# Patient Record
Sex: MALE | Race: WHITE | Employment: UNEMPLOYED | URBAN - METROPOLITAN AREA
[De-identification: names, ages, dates, MRNs, and addresses within clinical notes are randomized per-mention and may not be internally consistent; named-entity substitution may affect disease eponyms.]

---

## 2020-08-06 ENCOUNTER — OFFICE VISIT (OUTPATIENT)
Dept: ORTHOPEDIC SURGERY | Age: 65
End: 2020-08-06
Payer: MEDICARE

## 2020-08-06 VITALS — RESPIRATION RATE: 16 BRPM | HEIGHT: 70 IN | BODY MASS INDEX: 27.92 KG/M2 | WEIGHT: 195 LBS

## 2020-08-06 PROCEDURE — 99203 OFFICE O/P NEW LOW 30 MIN: CPT | Performed by: ORTHOPAEDIC SURGERY

## 2020-08-06 PROCEDURE — 20605 DRAIN/INJ JOINT/BURSA W/O US: CPT | Performed by: ORTHOPAEDIC SURGERY

## 2020-08-06 RX ORDER — INGENOL MEBUTATE 500 UG/G
GEL TOPICAL
COMMUNITY

## 2020-08-06 RX ORDER — DONEPEZIL HYDROCHLORIDE 10 MG/1
TABLET, FILM COATED ORAL
COMMUNITY
End: 2020-08-06

## 2020-08-06 RX ORDER — CYCLOSPORINE 0.5 MG/ML
EMULSION OPHTHALMIC
COMMUNITY
Start: 2020-07-19

## 2020-08-06 RX ORDER — METOPROLOL SUCCINATE 50 MG/1
TABLET, EXTENDED RELEASE ORAL
COMMUNITY
Start: 2020-01-23 | End: 2020-08-06

## 2020-08-06 RX ORDER — TEMAZEPAM 15 MG/1
CAPSULE ORAL
COMMUNITY
Start: 2020-07-21

## 2020-08-06 RX ORDER — FEXOFENADINE HCL 180 MG/1
180 TABLET ORAL DAILY
COMMUNITY
End: 2020-08-06

## 2020-08-06 RX ORDER — HYDROCODONE BITARTRATE AND ACETAMINOPHEN 5; 325 MG/1; MG/1
TABLET ORAL
COMMUNITY
End: 2020-08-06

## 2020-08-06 RX ORDER — MINOCYCLINE HYDROCHLORIDE 50 MG/1
50 CAPSULE ORAL DAILY
COMMUNITY

## 2020-08-06 RX ORDER — DOXEPIN HYDROCHLORIDE 10 MG/1
10 CAPSULE ORAL NIGHTLY
COMMUNITY

## 2020-08-06 RX ORDER — PANTOPRAZOLE SODIUM 40 MG/1
TABLET, DELAYED RELEASE ORAL
COMMUNITY
Start: 2020-07-30
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
## 2020-08-06 NOTE — PATIENT INSTRUCTIONS
AC joint injection into left shoulder  Rest the shoulder today  Work on ROM of shoulder  Physical therapy ordered   Follow up in 1 month for recheck on inj/PT

## 2020-08-18 ENCOUNTER — HOSPITAL ENCOUNTER (OUTPATIENT)
Dept: PHYSICAL THERAPY | Age: 65
Setting detail: THERAPIES SERIES
Discharge: HOME | End: 2020-08-18
Payer: MEDICARE

## 2020-08-18 PROCEDURE — 97162 PT EVAL MOD COMPLEX 30 MIN: CPT

## 2020-08-18 ASSESSMENT — PAIN SCALES - GENERAL: PAINLEVEL_OUTOF10: 1

## 2020-08-18 ASSESSMENT — PAIN DESCRIPTION - LOCATION: LOCATION: ARM;SHOULDER

## 2020-08-18 ASSESSMENT — PAIN DESCRIPTION - ORIENTATION: ORIENTATION: LEFT

## 2020-08-18 ASSESSMENT — PAIN DESCRIPTION - DESCRIPTORS: DESCRIPTORS: SORE;SHARP
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
## 2020-08-18 NOTE — PLAN OF CARE
Outpatient Physical Therapy        [] Phone: [phone]   Fax: [phone]   Pediatric Therapy          [] Phone: [phone]   Fax: [phone]  [facility]          [] Phone: [phone]   Fax: [phone]      To: Referring Practitioner: Taj Olguin    From: Luther Haynes PT     Patient: Juan Antonio Chacon       : 1955  Diagnosis: Arthritis left AC joint   Treatment Diagnosis: LUE pain, weakness, impaired ROM, impaired function   Date: 2020    Physical Therapy Certification/Re-Certification Form  Dear Dr. Ace Hughes,  The following patient has been evaluated for physical therapy services and for therapy to continue, Please review the attached evaluation and/or summary of the patient's plan of care, and verify that you agree therapy should continue by signing the attached document and sending it back to our office. Assessment:  Patient is a 72 y.o. male w/DX arthritis of left AC joint. Patient reports onset of left shoulder pain which started in 2020 after a biking trip and sleeping on a thin mattress. Injection by Lalo Cortes helped decreased pain considerably. Pt also reports left knee chronic pain which he would like addressed. Patient had diagnostics w/x-ray (+) OA left AC joint  Worse:  sleeping on left side, press-ups, pull-ups, riding 10 speed bike. Better:  Cortisone injection, changing positions (resting)  Today, patient presents w/mild left shoulder weakness and ROM limitation, s/s impingement syndrome, radicular symptoms (pins/needles), pain and will benefit from physical therapy.     Planned Services:  [x] Therapeutic Exercise    [] Aquatics:  [x] Therapeutic Activity    [x] Ultrasound  [x] Elec Stimulation  [] Gait Training     [] Cervical Traction [] Lumbar Traction  [x] Neuromuscular Re-education [x] Cold/hotpack [] Iontophoresis   [x] Instruction in HEP       [x] Manual Therapy     [x] vasopneumatic            [x] Self care home management        []Dry

## 2020-08-18 NOTE — FLOWSHEET NOTE
Outpatient Physical Therapy  [place]           [x] Phone: [phone]   Fax: [phone]  [facility]           [] Phone: [phone]   Fax: [phone]        Physical Therapy Daily Treatment Note  Date:  2020    Patient Name:  Bessie Tuttle    :  1955  MRN: [id number]  Restrictions/Precautions: Other position/activity restrictions: No formal restrictions  Diagnosis:   Diagnosis: Arthritis left AC joint  Date of Injury/Surgery: chronic  Treatment Diagnosis: Treatment Diagnosis: LUE pain, weakness, impaired ROM, impaired function    Insurance/Certification information: PT Insurance Information: [employer] - $40 co-pay   Referring Physician:  Referring Practitioner: Vijaya Forrester  Next Doctor Visit:  Unknown  Plan of care signed (Y/N): Pending   Outcome Measure: Quick Dash 34% disability  Visit# / total visits:     Pain level: 1/10  POC Date Range:  20 - 20     Goals:          Long term goals  Time Frame for Long term goals : In 6 weeks, patient will  Long term goal 1: demonstrate compliance and independence w/HEP. Long term goal 2: score <= 10% disability on Quick Dash as indication of improved daily mobility and function LUE. Long term goal 3: report at least 50% improvement w/washing and drying back after shower. Long term goal 4: no longer report radicular symptoms LUE w/daily activities or at night w/sleep. Summary of Evaluation: Assessment: Patient is a 72 y.o. male w/DX arthritis of left AC joint. Patient reports onset of left shoulder pain which started in 2020 after a biking trip and sleeping on a thin mattress. Injection by Julio Cesar Cervantes helped decreased pain considerably. Pt also reports left knee chronic pain which he would like addressed. Patient had diagnostics w/x-ray (+) OA left AC joint  Worse:  sleeping on left side, press-ups, pull-ups, riding 10 speed bike.   Better:  Cortisone injection, changing positions (resting)  Today, patient presents w/mild left shoulder weakness and ROM limitation, s/s impingement syndrome, radicular symptoms (pins/needles), pain and will benefit from physical therapy. Subjective:  See eval         Any changes in Ambulatory Summary Sheet? None        Objective:  See eval     Prior to today's treatment session, patient was screened for signs and symptoms related to COVID-19 including but not limited to verbally answering questions related to feeling ill, cough, or SOB, along with taking temperature via forehead thermometer. Patient presented with all negative signs and symptoms and had no fever >100 degrees Fahrenheit this date. Exercises: (No more than 4 columns)   Exercise/Equipment Date 8/18/20 #1 Date Date           WARM UP Evaluation completed. Await Pre-cert. TABLE                                       STANDING                                                     PROPRIOCEPTION                                    MODALITIES                      Other Therapeutic Activities/Education:    POC and treatment progression reviewed with and approved by patient. Home Exercise Program:    Pending    Manual Treatments:    NA    Modalities:    NA    Communication with other providers:    POC faxed to ordering provider. Assessment:  (Response towards treatment session) (Pain Rating)  Pt reported increased discomfort left shoulder after evaluation. 2-3/10 deep ache. Assessment: Patient is a 72 y.o. male w/DX arthritis of left AC joint. Patient reports onset of left shoulder pain which started in April 2020 after a biking trip and sleeping on a thin mattress. Injection by Chace Alicia helped decreased pain considerably. Pt also reports left knee chronic pain which he would like addressed. Patient had diagnostics w/x-ray (+) OA left AC joint  Worse:  sleeping on left side, press-ups, pull-ups, riding 10 speed bike.   Better:  Cortisone injection, changing positions (resting)  Today, patient presents w/mild left shoulder weakness and ROM limitation, s/s impingement syndrome, radicular symptoms (pins/needles), pain and will benefit from physical therapy. Plan for Next Session: Specific instructions for Next Treatment: RC strengthening, GH joint mobilization for posterior and caudal glides, initiate HEP, US AC joint and anterior shoulder soft tissue 100%, 1.0 DC, 1.5W/cm2      Time In / Time Out:     1422/1512    If BWC Please Indicate Time In/Out  CPT Code Time in Time out                                                              Timed Code/Total Treatment Minutes:  0'/50'        Next Progress Note due:   On or before 9/17/20      Plan of Care Interventions:  [x] Therapeutic Exercise  [x] Modalities:  [x] Therapeutic Activity     [x] Ultrasound  [x] Estim  [] Gait Training      [] Cervical Traction [] Lumbar Traction  [x] Neuromuscular Re-education    [x] Cold/hotpack [] Iontophoresis   [x] Instruction in HEP      [x] Vasopneumatic   [] Dry Needling    [x] Manual Therapy               [] Aquatic Therapy              Electronically signed by:  Barrie Cunningham, PT 8/18/2020, 6:19 PM

## 2020-08-19 NOTE — FLOWSHEET NOTE
Patient only approved for 6 visits,  If more visits are needed therapist will have to do a review with [employer]. E stim is not covered.

## 2020-08-21 NOTE — FLOWSHEET NOTE
Patients Plan of Care was received and signed. Signed POC was scanned and placed in the patients chart.     Mayte Mejia

## 2020-08-31 ENCOUNTER — HOSPITAL ENCOUNTER (OUTPATIENT)
Dept: PHYSICAL THERAPY | Age: 65
Setting detail: THERAPIES SERIES
Discharge: HOME | End: 2020-08-31
Payer: MEDICARE

## 2020-08-31 PROCEDURE — 97140 MANUAL THERAPY 1/> REGIONS: CPT

## 2020-08-31 PROCEDURE — 97035 APP MDLTY 1+ULTRASOUND EA 15: CPT

## 2020-08-31 PROCEDURE — 97530 THERAPEUTIC ACTIVITIES: CPT

## 2020-08-31 PROCEDURE — 97110 THERAPEUTIC EXERCISES: CPT

## 2020-08-31 NOTE — FLOWSHEET NOTE
rate it. Pt will continue to benefit from more education, ROM , and strengthening. Assessment: Patient is a 72 y.o. male w/DX arthritis of left AC joint. Patient reports onset of left shoulder pain which started in April 2020 after a biking trip and sleeping on a thin mattress. Injection by Jed Messina helped decreased pain considerably. Pt also reports left knee chronic pain which he would like addressed. Patient had diagnostics w/x-ray (+) OA left AC joint  Worse:  sleeping on left side, press-ups, pull-ups, riding 10 speed bike. Better:  Cortisone injection, changing positions (resting)  Today, patient presents w/mild left shoulder weakness and ROM limitation, s/s impingement syndrome, radicular symptoms (pins/needles), pain and will benefit from physical therapy. Plan for Next Session: Specific instructions for Next Treatment: RC strengthening, GH joint mobilization for posterior and caudal glides, initiate HEP, US AC joint and anterior shoulder soft tissue 100%, 1.0 DC, 1.5W/cm2      Time In / Time Out: 1430/1524       If BWC Please Indicate Time In/Out  CPT Code Time in Time out                                                              Timed Code/Total Treatment Minutes:  54'/54'  1 US ( 8') 1 TA (8') 1 man( 16') 1 TE (20')       Next Progress Note due:   On or before 9/17/20      Plan of Care Interventions:  [x] Therapeutic Exercise  [x] Modalities:  [x] Therapeutic Activity     [x] Ultrasound  [x] Estim  [] Gait Training      [] Cervical Traction [] Lumbar Traction  [x] Neuromuscular Re-education    [x] Cold/hotpack [] Iontophoresis   [x] Instruction in HEP      [x] Vasopneumatic   [] Dry Needling    [x] Manual Therapy               [] Aquatic Therapy              Electronically signed by:  Justin López PTA 8/31/2020, 12:56 PM       8/31/2020,5:04 PM

## 2020-09-08 ENCOUNTER — HOSPITAL ENCOUNTER (OUTPATIENT)
Dept: PHYSICAL THERAPY | Age: 65
Setting detail: THERAPIES SERIES
Discharge: HOME | End: 2020-09-08
Payer: MEDICARE

## 2020-09-08 PROCEDURE — 97140 MANUAL THERAPY 1/> REGIONS: CPT

## 2020-09-08 PROCEDURE — 97110 THERAPEUTIC EXERCISES: CPT

## 2020-09-08 NOTE — FLOWSHEET NOTE
Outpatient Physical Therapy  [place]           [x] Phone: [phone]   Fax: [phone]  [place]           [] Phone: [phone]   Fax: [phone]        Physical Therapy Daily Treatment Note  Date:  2020    Patient Name:  Zoraida Qureshi    :  1955  MRN: [id number]  Restrictions/Precautions: Other position/activity restrictions: No formal restrictions  Diagnosis:   Diagnosis: Arthritis left AC joint  Date of Injury/Surgery: chronic  Treatment Diagnosis: Treatment Diagnosis: LUE pain, weakness, impaired ROM, impaired function    Insurance/Certification information: PT Insurance Information: [place] - $40 co-pay   Referring Physician:  Referring Practitioner: Lon Ge  Next Doctor Visit:  Unknown  Plan of care signed (Y/N): YES  Outcome Measure: Quick Dash 34% disability  Visit# / total visits:  3/6  Pain level: 0 /10 at rest  POC Date Range:  20 - 20  (Patient only approved for 6 visits,  If more visits are needed therapist will have to do a review with [employer]. E stim is not covered.)     Goals:          Long term goals  Time Frame for Long term goals : In 6 weeks, patient will  Long term goal 1: demonstrate compliance and independence w/HEP. Long term goal 2: score <= 10% disability on Quick Dash as indication of improved daily mobility and function LUE. Long term goal 3: report at least 50% improvement w/washing and drying back after shower. Long term goal 4: no longer report radicular symptoms LUE w/daily activities or at night w/sleep. Summary of Evaluation: Assessment: Patient is a 72 y.o. male w/DX arthritis of left AC joint. Patient reports onset of left shoulder pain which started in 2020 after a biking trip and sleeping on a thin mattress. Injection by Amber Hernandez helped decreased pain considerably. Pt also reports left knee chronic pain which he would like addressed.   Patient had diagnostics w/x-ray (+) OA left AC joint  Worse:  sleeping on left side, press-ups, pull-ups, riding 10 speed bike. Better:  Cortisone injection, changing positions (resting)  Today, patient presents w/mild left shoulder weakness and ROM limitation, s/s impingement syndrome, radicular symptoms (pins/needles), pain and will benefit from physical therapy. Subjective:  Pt rode bike 3 miles to this appointment today w/o difficulty. States his left neck will be painful after riding 13 miles and feels it is associated w/his shoulder problem. Any changes in Ambulatory Summary Sheet? None        Objective:     Prior to today's treatment session, patient was screened for signs and symptoms related to COVID-19 including but not limited to verbally answering questions related to feeling ill, cough, or SOB, along with taking temperature via forehead thermometer. Patient presented with all negative signs and symptoms and had no fever >100 degrees Fahrenheit this date. Decreased scap mobility left (assymetric w/right)  R wings naturally/L fixed to ribcage  Rounded shoulder   Forward head  Hypomobility lower C-spine/T-spine  Pronounced C-7  TTP parascapular muscles (latent) at T3-6      Exercises: (No more than 4 columns)   Exercise/Equipment Date 8/18/20 #1 Date  8/31/2020 #2 Date 9/08/20  #3           WARM UP Evaluation completed. Await Pre-cert. TABLE      Prone HA      Prone Extension      Prone Rows   LUE  2 x 10  2# hand wt  Vc/tc required   Chin tucks   Towel roll under crown of head  2 x 10            STANDING      ER       LAE      Mid row    GTB  2 x 10  Vc/tc required                                PROPRIOCEPTION                                    MODALITIES                      Other Therapeutic Activities/Education:    POC and treatment progression reviewed with and approved by patient. Home Exercise Program:    Prone shoulder extension, HA, prone rows, standing ER w/ TB, mid row w/ TB,and LAE w/ TB .  Given blue and yellow TB  9/8/20: Added chin tucks in supine    Manual Treatments:    Prone:  A/P joint mobs lower c-spine/upper t-spine  R side lying:  Scapular mobilization for subscapularis elongation and upward rotation   Supine:  Subscapularis soft tissue release  C-spine lateral and A/P glides  bilat levator stretch  bilat pec minor and UT stretch  C-spine PROM RR/RL      Modalities:    NA    Communication with other providers:    POC faxed to ordering provider. Assessment:  (Response towards treatment session) (Pain Rating) Pt tolerated treatment fair. Pt reported increased pain after treatment,but did not rate it. Pt will continue to benefit from more education, ROM , and strengthening. Assessment: Patient is a 72 y.o. male w/DX arthritis of left AC joint. Patient reports onset of left shoulder pain which started in April 2020 after a biking trip and sleeping on a thin mattress. Injection by Jennifer Loco helped decreased pain considerably. Pt also reports left knee chronic pain which he would like addressed. Patient had diagnostics w/x-ray (+) OA left AC joint  Worse:  sleeping on left side, press-ups, pull-ups, riding 10 speed bike. Better:  Cortisone injection, changing positions (resting)  Today, patient presents w/mild left shoulder weakness and ROM limitation, s/s impingement syndrome, radicular symptoms (pins/needles), pain and will benefit from physical therapy. Plan for Next Session: Specific instructions for Next Treatment: RC strengthening, GH joint mobilization for posterior and caudal glides, initiate HEP, US AC joint and anterior shoulder soft tissue 100%, 1.0 DC, 1.5W/cm2      Time In / Time Out: 8781/0728    If [facility] Please Indicate Time In/Out  CPT Code Time in Time out                                                              Timed Code/Total Treatment Minutes:  TE 35' (2), MT 30' (2)      Next Progress Note due:   On or before 9/17/20      Plan of Care Interventions:  [x] Therapeutic Exercise  [x]

## 2020-09-15 ENCOUNTER — HOSPITAL ENCOUNTER (OUTPATIENT)
Dept: PHYSICAL THERAPY | Age: 65
Setting detail: THERAPIES SERIES
Discharge: HOME | End: 2020-09-15
Payer: MEDICARE

## 2020-09-15 PROCEDURE — 97110 THERAPEUTIC EXERCISES: CPT

## 2020-09-15 PROCEDURE — 97112 NEUROMUSCULAR REEDUCATION: CPT

## 2020-09-15 NOTE — FLOWSHEET NOTE
side, press-ups, pull-ups, riding 10 speed bike. Better:  Cortisone injection, changing positions (resting)  Today, patient presents w/mild left shoulder weakness and ROM limitation, s/s impingement syndrome, radicular symptoms (pins/needles), pain and will benefit from physical therapy. Subjective:  Pt rode bike 3 miles to this appointment today w/o difficulty. States his left neck will be painful after riding 13 miles and feels it is associated w/his shoulder problem. Any changes in Ambulatory Summary Sheet? None        Objective:     Prior to today's treatment session, patient was screened for signs and symptoms related to COVID-19 including but not limited to verbally answering questions related to feeling ill, cough, or SOB, along with taking temperature via forehead thermometer. Patient presented with all negative signs and symptoms and had no fever >100 degrees Fahrenheit this date. Decreased scap mobility left (assymetric w/right)  R wings naturally/L fixed to ribcage  Rounded shoulder   Forward head  Hypomobility lower C-spine/T-spine  Pronounced C-7  TTP parascapular muscles (latent) at T3-6      Exercises: (No more than 4 columns)   Exercise/Equipment Date 8/18/20 #1 Date  8/31/2020 #2 Date 9/08/20  #3           WARM UP Evaluation completed. Await Pre-cert. TABLE      Prone HA      Prone Extension      Prone Rows   LUE  2 x 10  2# hand wt  Vc/tc required   Chin tucks   Towel roll under crown of head  2 x 10            STANDING      ER       LAE      Mid row    GTB  2 x 10  Vc/tc required                                PROPRIOCEPTION                                    MODALITIES                      Other Therapeutic Activities/Education:    POC and treatment progression reviewed with and approved by patient. Home Exercise Program:    Prone shoulder extension, HA, prone rows, standing ER w/ TB, mid row w/ TB,and LAE w/ TB .  Given blue and yellow TB  9/8/20: Added chin tucks in supine    Manual Treatments:    Prone:  A/P joint mobs lower c-spine/upper t-spine  R side lying:  Scapular mobilization for subscapularis elongation and upward rotation   Supine:  Subscapularis soft tissue release  C-spine lateral and A/P glides  bilat levator stretch  bilat pec minor and UT stretch  C-spine PROM RR/RL      Modalities:    NA    Communication with other providers:    POC faxed to ordering provider. Assessment:  (Response towards treatment session) (Pain Rating) Pt tolerated treatment fair. Pt reported increased pain after treatment,but did not rate it. Pt will continue to benefit from more education, ROM , and strengthening. Assessment: Patient is a 72 y.o. male w/DX arthritis of left AC joint. Patient reports onset of left shoulder pain which started in April 2020 after a biking trip and sleeping on a thin mattress. Injection by Nabila Blinks helped decreased pain considerably. Pt also reports left knee chronic pain which he would like addressed. Patient had diagnostics w/x-ray (+) OA left AC joint  Worse:  sleeping on left side, press-ups, pull-ups, riding 10 speed bike. Better:  Cortisone injection, changing positions (resting)  Today, patient presents w/mild left shoulder weakness and ROM limitation, s/s impingement syndrome, radicular symptoms (pins/needles), pain and will benefit from physical therapy. Plan for Next Session: Specific instructions for Next Treatment: RC strengthening, GH joint mobilization for posterior and caudal glides, initiate HEP, US AC joint and anterior shoulder soft tissue 100%, 1.0 DC, 1.5W/cm2      Time In / Time Out: 9783/9922    If [facility] Please Indicate Time In/Out  CPT Code Time in Time out                                                              Timed Code/Total Treatment Minutes:  TE 35' (2), MT 30' (2)      Next Progress Note due:   On or before 9/17/20      Plan of Care Interventions:  [x] Therapeutic Exercise  [x] Modalities:  [x] Therapeutic Activity     [x] Ultrasound  [x] Estim  [] Gait Training      [] Cervical Traction [] Lumbar Traction  [x] Neuromuscular Re-education    [x] Cold/hotpack [] Iontophoresis   [x] Instruction in HEP      [x] Vasopneumatic   [] Dry Needling    [x] Manual Therapy               [] Aquatic Therapy              Electronically signed by:  Darcie Cota, PT 9/15/2020, 2:43 PM       9/15/2020,2:43 PM

## 2020-09-15 NOTE — FLOWSHEET NOTE
Outpatient Physical Therapy  [place]           [x] Phone: [phone]   Fax: [phone]  [place]           [] Phone: [phone]   Fax: [phone]        Physical Therapy Daily Treatment Note  Date:  9/15/2020    Patient Name:  Dayanara Blair    :  1955  MRN: [id number]  Restrictions/Precautions: Other position/activity restrictions: No formal restrictions  Diagnosis:   Diagnosis: Arthritis left AC joint  Date of Injury/Surgery: chronic  Treatment Diagnosis: Treatment Diagnosis: LUE pain, weakness, impaired ROM, impaired function    Insurance/Certification information: PT Insurance Information: [place] - $40 co-pay   Referring Physician:  Referring Practitioner: Mo Mcginnis Doctor Visit:  Unknown  Plan of care signed (Y/N): YES  Outcome Measure: Quick Dash 34% disability  Visit# / total visits:  /  (Eval plus 6)  Pain level: 0 /10 at rest  POC Date Range:  20 - 20  (Patient only approved for 6 visits,  If more visits are needed therapist will have to do a review with [employer]. E stim is not covered.)     Goals:          Long term goals  Time Frame for Long term goals : In 6 weeks, patient will  Long term goal 1: demonstrate compliance and independence w/HEP. Long term goal 2: score <= 10% disability on Quick Dash as indication of improved daily mobility and function LUE. Long term goal 3: report at least 50% improvement w/washing and drying back after shower. Long term goal 4: no longer report radicular symptoms LUE w/daily activities or at night w/sleep. Summary of Evaluation: Assessment: Patient is a 72 y.o. male w/DX arthritis of left AC joint. Patient reports onset of left shoulder pain which started in 2020 after a biking trip and sleeping on a thin mattress. Injection by Brian Gonzalez helped decreased pain considerably. Pt also reports left knee chronic pain which he would like addressed.   Patient had diagnostics w/x-ray (+) OA left AC joint  Worse: sleeping on left side, press-ups, pull-ups, riding 10 speed bike. Better:  Cortisone injection, changing positions (resting)  Today, patient presents w/mild left shoulder weakness and ROM limitation, s/s impingement syndrome, radicular symptoms (pins/needles), pain and will benefit from physical therapy. Subjective:  Pt rode bike 17 miles yesterday w/moderate neck pain. Today, riding 13 miles here for appointment, neck pain is mild. Continues to have left shoulder pain when resting it on the armchair or elevated on a table, and will have to let it dangle. Overall, patient has difficulty knowing if he is improving. Able to pin-point shoulder pain at posterolateral shoulder. Any changes in Ambulatory Summary Sheet? None    Objective:     Prior to today's treatment session, patient was screened for signs and symptoms related to COVID-19 including but not limited to verbally answering questions related to feeling ill, cough, or SOB, along with taking temperature via forehead thermometer. Patient presented with all negative signs and symptoms and had no fever >100 degrees Fahrenheit this date. Decreased scap mobility left (assymetric w/right)  R wings naturally/L fixed to ribcage  Rounded shoulders  Forward head  Hypomobility lower C-spine/T-spine  Pronounced C-7  TTP parascapular muscles (latent) at T3-6  TTP (mild) teres minor and soft tissue surrounding AC joint      Exercises: (No more than 4 columns)   Exercise/Equipment Date 8/18/20   Evaluation Date  8/31/2020 #1 Date 9/08/20  #2 Date 9/15/20 #3           Address goals/ROM/strength/  Quick Dash this visit   WARM UP Evaluation completed. Await Pre-cert.  UBE   3'F  3'B  (late entry 9/15/20 SM) UBE   3'F  3'B  (late entry 9/15/20 SM) UBE   3'F  3'B                       TABLE        Prone FLOR                        Prone flexion    2# hand wt  2 x 10    Prone Abd/straight elbow    2# hand wt  2 x 10    Prone Extension    2# hand wt  2 x 10 Prone Rows   LUE  2 x 10  2# hand wt  Vc/tc required LUE  2 x 10  3# hand wt      Prone ER 90/90    1#  2 x 10    Chin tucks   Towel roll under crown of head  2 x 10 Reviewed               STANDING        ER     YTB   Towel under arm  2 x 10    LAE        Mid row    GTB  2 x 10  Vc/tc required GTB  2 x 10  Vc/tc required    TRX Dangle    1 x 10 sec                                 PROPRIOCEPTION                                                MODALITIES                            Other Therapeutic Activities/Education:    POC and treatment progression reviewed with and approved by patient. Neuromuscular Re-education:  VC/TC/Demo for proper technique and TC for muscle recruitment given to ensure maximum therapeutic benefit and functional outcome. Activities performed included: All prone and standing exercises listed above. Home Exercise Program:    Prone shoulder extension, HA, prone rows, standing ER w/ TB, mid row w/ TB,and LAE w/ TB . Given blue and yellow TB  9/8/20:  Added chin tucks in supine  9/15/20:  Added and/or reviewed  Prone shoulder w/2# wt flex, horizontal abd, horizontal abd 90/90 (bucket lift), ER 90/90 1# wt  TRX dangle       Manual Treatments:      Modalities:    NA    Communication with other providers:    POC faxed to ordering provider. Assessment:  (Response towards treatment session) (Pain Rating)   0/10 pain relaxed standing position. No grimacing or complaint of pain during session. Today, patient wished to review his HEP, however had difficulty understanding rationale and proper execution of exercises. Pt appears to be a very logically minded individual, however had scattered thought pattern today, moving from one activity to another and  was difficult to follow. Patient states he prefers written instruction, a bullet point list of exercises vs handouts. Assessment: Patient is a 72 y.o. male w/DX arthritis of left AC joint. Patient reports onset of left shoulder pain which started in April 2020 after a biking trip and sleeping on a thin mattress. Injection by Sade Garcia helped decreased pain considerably. Pt also reports left knee chronic pain which he would like addressed. Patient had diagnostics w/x-ray (+) OA left AC joint  Worse:  sleeping on left side, press-ups, pull-ups, riding 10 speed bike. Better:  Cortisone injection, changing positions (resting)  Today, patient presents w/mild left shoulder weakness and ROM limitation, s/s impingement syndrome, radicular symptoms (pins/needles), pain and will benefit from physical therapy. Plan for Next Session: Specific instructions for Next Treatment: RC strengthening, GH joint mobilization for posterior and caudal glides, initiate HEP, US AC joint and anterior shoulder soft tissue 100%, 1.0 DC, 1.5W/cm2    Develop bullet point list of exercise w/client. Time In / Time Out:  1445/1545    If [facility] Please Indicate Time In/Out  CPT Code Time in Time out                                                              Timed Code/Total Treatment Minutes:  60'/60'  TE 39' (3), NRE 15' (1)      Next Progress Note due:   On or before 9/17/20      Plan of Care Interventions:  [x] Therapeutic Exercise  [x] Modalities:  [x] Therapeutic Activity     [x] Ultrasound  [x] Estim  [] Gait Training      [] Cervical Traction [] Lumbar Traction  [x] Neuromuscular Re-education    [x] Cold/hotpack [] Iontophoresis   [x] Instruction in HEP      [x] Vasopneumatic   [] Dry Needling    [x] Manual Therapy               [] Aquatic Therapy              Electronically signed by:  Joann Acosta, INEZ 9/15/2020, 2:49 PM       9/15/2020,2:49 PM

## 2020-09-22 ENCOUNTER — HOSPITAL ENCOUNTER (OUTPATIENT)
Dept: PHYSICAL THERAPY | Age: 65
Setting detail: THERAPIES SERIES
Discharge: HOME | End: 2020-09-22
Payer: MEDICARE

## 2020-09-22 PROCEDURE — 97162 PT EVAL MOD COMPLEX 30 MIN: CPT

## 2020-09-22 ASSESSMENT — PAIN DESCRIPTION - LOCATION: LOCATION: KNEE

## 2020-09-22 ASSESSMENT — PAIN DESCRIPTION - PAIN TYPE: TYPE: CHRONIC PAIN

## 2020-09-22 ASSESSMENT — PAIN DESCRIPTION - DESCRIPTORS: DESCRIPTORS: SHARP;SORE

## 2020-09-22 ASSESSMENT — PAIN SCALES - GENERAL: PAINLEVEL_OUTOF10: 0

## 2020-09-22 ASSESSMENT — PAIN DESCRIPTION - ORIENTATION: ORIENTATION: LEFT

## 2020-09-22 ASSESSMENT — PAIN DESCRIPTION - FREQUENCY: FREQUENCY: INTERMITTENT

## 2020-09-22 NOTE — FLOWSHEET NOTE
Outpatient Physical Therapy  [place]           [x] Phone: [phone]   Fax: [phone]  [place]           [] Phone: [phone]   Fax: [phone]        Physical Therapy Daily Treatment Note  Date:  2020    Patient Name:  Karan Jones    :  1955  MRN: [id number]  Restrictions/Precautions: Other position/activity restrictions: No formal restrictions  Diagnosis:   Diagnosis: Left leg pain  Date of Injury/Surgery: Chronic  Treatment Diagnosis: Treatment Diagnosis: L knee weakness, symptoms consistent w/patella tendon syndrome    Insurance/Certification information: PT Insurance Information: [place] - $40 co-pay   Referring Physician:  Referring Practitioner: Haven Brown  Next Doctor Visit:   Unknown  Plan of care signed (Y/N):  Pending  Outcome Measure: KOOS 15/28 (53.6%)  Visit# / total visits:     Or as per insurance approval  Pain level: 0/10 (can go up to 7-8/10 w/loading knee)  POC Date Range 20 - 10/22/20     Goals:          Long term goals  Time Frame for Long term goals : In 6 weeks, patient will  Long term goal 1: demonstrate compliance and independence w/HEP. Long term goal 2: score <= 5/28 on KOOS score as indication of improved LE function w/daily activities. Long term goal 3: have no >= 3/10 left knee discomfort w/weight bearing activities for improved LE function (stairs, on/off bike, sit><stand, squatting, kneeling)    Summary of Evaluation: Assessment: Patient is a 72 y.o. male w/L knee pain. Pt reports left knee pain x >= 15 years which has been worsening over the years. Especially over the last two months and w/certain activities. Pt points directly to left patella tendon and demonstrates increased pain w/standing knee flexion b/t 20 and 30 deg and w/deceleration. Increased pain also w/pivot over planted foot. PLOF:  Patient has been and is independent w/all mobility, self-care, high level physical activities.   WORSE:  stairs, getting on bike, deceleration LLE, standing prolonged periods,pain w/standing knee flexion b/t 20 and 30 deg and w/deceleration. Able to cycle up to 26 miles w/o left knee soreness, diffuse mild soreness w/running. Today, patient presents w/lateral tracking bilat patella, related to shortened/tight ITBs, shortened/tight hamstrings bilat, mild weakness L knee, pain w/eccentric loading left quad and will benefit from physical therapy. Subjective:  See eval         Any changes in Ambulatory Summary Sheet? None        Objective:  See eval     Prior to today's treatment session, patient was screened for signs and symptoms related to COVID-19 including but not limited to verbally answering questions related to feeling ill, cough, or SOB, along with taking temperature via forehead thermometer. Patient presented with all negative signs and symptoms and had no fever >100 degrees Fahrenheit this date. Exercises: (No more than 4 columns)   Exercise/Equipment Date 9/22/20 - Evaluation Date Date      Await insurance approval.     WARM UP                     TABLE                                       STANDING                                                     PROPRIOCEPTION                                    MODALITIES                      Other Therapeutic Activities/Education:    Reviewed POC and treatment progression expected. Home Exercise Program:    Self-massage ITB  SLR w/VMO focus    Manual Treatments:    NA    Modalities:    NA    Communication with other providers:        Assessment:  (Response towards treatment session) (Pain Rating)  No change in pain level post treatment. Had lingering discomfort after single leg sit trial.    Assessment: Patient is a 72 y.o. male w/L knee pain. Pt reports left knee pain x >= 15 years which has been worsening over the years. Especially over the last two months and w/certain activities.   Pt points directly to left patella tendon and demonstrates increased pain w/standing knee flexion b/t 20 and 30 deg and w/deceleration. Increased pain also w/pivot over planted foot. PLOF:  Patient has been and is independent w/all mobility, self-care, high level physical activities. WORSE:  stairs, getting on bike, deceleration LLE, standing prolonged periods,pain w/standing knee flexion b/t 20 and 30 deg and w/deceleration. Able to cycle up to 26 miles w/o left knee soreness, diffuse mild soreness w/running. Today, patient presents w/lateral tracking bilat patella, related to shortened/tight ITBs, shortened/tight hamstrings bilat, mild weakness L knee, pain w/eccentric loading left quad and will benefit from physical therapy. Plan for Next Session: Specific instructions for Next Treatment: ITB myofascial/STM and stretch, hamstring stretch,quad strengthening/focus VMO, rotary hip add/ext, closed chain hamstring      Time In / Time Out:     6156/0071    If [facility] Please Indicate Time In/Out  CPT Code Time in Time out                                                              Timed Code/Total Treatment Minutes:  0/57'  Eval only then precert      Next Progress Note due:   On or before 10/22/20      Plan of Care Interventions:  [x] Therapeutic Exercise  [x] Modalities:  [x] Therapeutic Activity     [x] Ultrasound  [x] Estim  [] Gait Training      [] Cervical Traction [] Lumbar Traction  [x] Neuromuscular Re-education    [x] Cold/hotpack [] Iontophoresis   [x] Instruction in HEP      [x] Vasopneumatic   [x] Dry Needling    [x] Manual Therapy               [] Aquatic Therapy              Electronically signed by:  Satish Roth, PT 9/22/2020, 7:00 PM

## 2020-09-22 NOTE — PLAN OF CARE
Outpatient Physical Therapy        [] Phone: [phone]   Fax: [phone]   Pediatric Therapy          [] Phone: [phone]   Fax: [phone]  [facility]          [] Phone: [phone]   Fax: [phone]      To: Referring Practitioner: Layla Thorpe    From: Marcelino Harris, PT     Patient: Souleymane Adam       : 1955  Diagnosis: Left leg pain   Treatment Diagnosis: L knee weakness, symptoms consistent w/patella tendon syndrome   Date: 2020    Physical Therapy Certification/Re-Certification Form  Dear Dr. Barry Roger,  The following patient has been evaluated for physical therapy services and for therapy to continue, Please review the attached evaluation and/or summary of the patient's plan of care, and verify that you agree therapy should continue by signing the attached document and sending it back to our office. Assessment:  Patient is a 72 y.o. male w/L knee pain. Pt reports left knee pain x >= 15 years which has been worsening over the years. Especially over the last two months and w/certain activities. Pt points directly to left patella tendon and demonstrates increased pain w/standing knee flexion b/t 20 and 30 deg and w/deceleration. Increased pain also w/pivot over planted foot. PLOF:  Patient has been and is independent w/all mobility, self-care, high level physical activities. WORSE:  stairs, getting on bike, deceleration LLE, standing prolonged periods,pain w/standing knee flexion b/t 20 and 30 deg and w/deceleration. Able to cycle up to 26 miles w/o left knee soreness, diffuse mild soreness w/running. Today, patient presents w/lateral tracking bilat patella, related to shortened/tight ITBs, shortened/tight hamstrings bilat, mild weakness L knee, pain w/eccentric loading left quad and will benefit from physical therapy.     Planned Services:  [x] Therapeutic Exercise    [] Aquatics:  [x] Therapeutic Activity    [x] Ultrasound  [x] Elec Stimulation  [] Gait

## 2020-09-22 NOTE — PROGRESS NOTES
Physical Therapy  Initial Assessment  Date: 2020  Patient Name: Angeles Zepeda  MRN: [id number]  : 1955     Treatment Diagnosis: L knee weakness, symptoms consistent w/patella tendon syndrome    Restrictions  Position Activity Restriction  Other position/activity restrictions: No formal restrictions    Subjective   General  Chart Reviewed: Yes  Patient assessed for rehabilitation services?: Yes  Additional Pertinent Hx: PMH:  Hx fractured collar bones, left surgically repaired 5 years ago, fractured L wrist w/surgical repair approx 20 years ago, sleep apnea, chest pain w/running, stress test negative, cardiac ablation(s) d/t a-fib  Family / Caregiver Present: No  Referring Practitioner: Nelson Brock  Referral Date : 20  Diagnosis: Left leg pain  Follows Commands: Within Functional Limits  General Comment  Comments: Pt reports left knee pain x >= 15 years which has been worsening over the years. Especially over the last two months and w/certain activities. Pt points directly to left patella tendon and demonstrates increased pain w/standing knee flexion b/t 20 and 30 deg and w/deceleration. Increased pain also w/pivot over planted foot. PLOF:  Patient has been and is independent w/all mobility, self-care, high level physical activities. WORSE:  stairs, getting on bike, standing prolonged periods,pain w/standing knee flexion b/t 20 and 30 deg and w/deceleration. Able to cycle up to 26 miles w/o left knee soreness, diffuse mild soreness w/running. PT Visit Information  PT Insurance Information: [place] - $40 co-pay  Pain Screening  Patient Currently in Pain: Yes  Pain Assessment  Pain Assessment: 0-10  Pain Level: 0  Patient's Stated Pain Goal: No pain  Pain Type: Chronic pain  Pain Location: Knee  Pain Orientation: Left  Pain Descriptors: Samara [place]; Sore  Pain Frequency: Intermittent  Vital Signs  Patient Currently in Pain: Yes    Vision/Hearing  Vision  Vision: Within Functional Limits  Hearing  Hearing: Within functional limits    Orientation  Orientation  Overall Orientation Status: Within Normal Limits    Social/Functional History  Social/Functional History  Lives With: Alone  Type of Home: House  Home Layout: One level  Home Access: Stairs to enter with rails  Entrance Stairs - Number of Steps: approx 6 rosemary w/left rail to enter  Entrance Stairs - Rails: Left  ADL Assistance: [address]: Independent  Homemaking Responsibilities: Yes  Ambulation Assistance: Independent  Transfer Assistance: Independent  Active : Yes  Mode of Transportation: Car  Occupation: Unemployed  Type of occupation: RN  Leisure & Hobbies: working out, physical activity    Objective     Observation/Palpation  Posture: Good  Palpation: Mild TTP left patella tendon, superior patella w/joint mobilization    PROM RLE (degrees)  RLE PROM: Exceptions  R SLR: 0-60 deg supine  R Hip Extension 0-10: 0-10  AROM RLE (degrees)  RLE AROM: Exceptions  R Knee Flexion 0-145: 0-135  R Knee Extension 0: 0  PROM LLE (degrees)  LLE PROM: Exceptions  L SLR: 0-66 deg supine  L Hip Flexion 0-125: 0-125  L Hip Extension 0-10: 0-10  AROM LLE (degrees)  LLE AROM : Exceptions  L Knee Flexion 0-145: 0-130    Strength RLE  Comment: 5/5 MMGs  Strength LLE  Comment: 5/5 hip flex/ext, 4/5 knee ext, 5/5 knee flex     Additional Measures  Flexibility: shortened/tight ITB R/L, shortened/tight hamstrings R/L  Special Tests: (+) Tevin's bilat for ITB shortening, unable to perform single leg sit/stand LLE (able to perform on right), crepitus bilat patella w/pain left, (-) valgus/varus pain, (-) anterior/posterior drawer (ligaments intact)  Other: R/L Patella track laterally,  KOOS 15/28 (53.6% disability)  Sensation  Overall Sensation Status: (intact gross touch bilat LEs.   Denies radicular pain or paresthesias today.)     Ambulation  Ambulation?: Yes  Ambulation 1  Surface: level tile;carpet  Device: No Device  Assistance: Independent  Quality of Gait: Non-antalgic, brisk gait  Gait Deviations: None  Distance: 100 ft x 2     Assessment   Conditions Requiring Skilled Therapeutic Intervention  Body structures, Functions, Activity limitations: Decreased strength; Increased pain;Decreased ROM; Decreased ADL status; Decreased functional mobility   Assessment: Patient is a 72 y.o. male w/L knee pain. Pt reports left knee pain x >= 15 years which has been worsening over the years. Especially over the last two months and w/certain activities. Pt points directly to left patella tendon and demonstrates increased pain w/standing knee flexion b/t 20 and 30 deg and w/deceleration. Increased pain also w/pivot over planted foot. PLOF:  Patient has been and is independent w/all mobility, self-care, high level physical activities. WORSE:  stairs, getting on bike, deceleration LLE, standing prolonged periods,pain w/standing knee flexion b/t 20 and 30 deg and w/deceleration. Able to cycle up to 26 miles w/o left knee soreness, diffuse mild soreness w/running. Today, patient presents w/lateral tracking bilat patella, related to shortened/tight ITBs, shortened/tight hamstrings bilat, mild weakness L knee, pain w/eccentric loading left quad and will benefit from physical therapy. Treatment Diagnosis: L knee weakness, symptoms consistent w/patella tendon syndrome  Prognosis: Good  Decision Making: Medium Complexity  History: PMH:  Hx fractured collar bones, left surgically repaired 5 years ago, fractured L wrist w/surgical repair approx 20 years ago, sleep apnea, chest pain w/running, stress test negative, cardiac ablation(s) d/t a-fib  Exam: MMT, ROM, palpation  Clinical Presentation: Medium complexity - evolving/chronic nature of injury  Barriers to Learning: None noted today.   Learns w/demo, repeat demo/repetition, handouts  REQUIRES PT FOLLOW UP: Yes  Treatment Initiated : see flow sheet  Activity Tolerance  Activity Tolerance: Patient Tolerated treatment well         Plan   Plan  Times per week: 1  Plan weeks: 6  Specific instructions for Next Treatment: ITB myofascial/STM and stretch, hamstring stretch,quad strengthening/focus VMO, rotary hip add/ext, closed chain hamstring  Current Treatment Recommendations: Strengthening, ROM, Neuromuscular Re-education, Manual Therapy - Soft Tissue Mobilization, Manual Therapy - Joint Manipulation, Pain Management, Modalities, Patient/Caregiver Education & Training, Home Exercise Program    Goals  Long term goals  Time Frame for Long term goals : In 6 weeks, patient will  Long term goal 1: demonstrate compliance and independence w/HEP. Long term goal 2: score <= 5/28 on KOOS score as indication of improved LE function w/daily activities.   Long term goal 3: have no >= 3/10 left knee discomfort w/weight bearing activities for improved LE function (stairs, on/off bike, sit><stand, squatting, kneeling)  Patient Goals   Patient goals : improve left knee function/decrease pain w/daily activity (biking, stairs, squats)       Therapy Time   Individual Concurrent Group Co-treatment   Time In 1448         Time Out 1545         Minutes 57         Timed Code Treatment Minutes: 0 Minutes       Reagan Nicholson, PT

## 2020-09-24 ENCOUNTER — HOSPITAL ENCOUNTER (OUTPATIENT)
Dept: PHYSICAL THERAPY | Age: 65
Setting detail: THERAPIES SERIES
Discharge: HOME | End: 2020-09-24
Payer: MEDICARE

## 2020-09-24 PROCEDURE — 97110 THERAPEUTIC EXERCISES: CPT

## 2020-09-24 NOTE — FLOWSHEET NOTE
WORSE:  stairs, getting on bike, deceleration LLE, standing prolonged periods,pain w/standing knee flexion b/t 20 and 30 deg and w/deceleration. Able to cycle up to 26 miles w/o left knee soreness, diffuse mild soreness w/running. Today, patient presents w/lateral tracking bilat patella, related to shortened/tight ITBs, shortened/tight hamstrings bilat, mild weakness L knee, pain w/eccentric loading left quad and will benefit from physical therapy. Subjective:  Quincy Walters reports that he has been performing his home exercises diligently. He did notice some increased pain with the SLR. Any changes in Ambulatory Summary Sheet? None        Objective:  See eval     Prior to today's treatment session, patient was screened for signs and symptoms related to COVID-19 including but not limited to verbally answering questions related to feeling ill, cough, or SOB, along with taking temperature via forehead thermometer. Patient presented with all negative signs and symptoms and had no fever >100 degrees Fahrenheit this date. Creptitus palpation with B knee flexion and extension          Exercises: (No more than 4 columns)   Exercise/Equipment Date 9/22/20 - Evaluation 9/24/20 #2 Date      Await insurance approval.     WARM UP                     TABLE      SLR with VMO  2x10 (tactile and verbal cueing for VMO contraction)    Supine hamstring stretch  3x30\" hold    Supine IT band stretch  3x30\"hold                   STANDING      Dynamic lateral leg stretch (hip swing)  30\", x2    Dynamic hams/hip flex stretch (hip swing forward and back)  30\", x2                                       PROPRIOCEPTION                                    MODALITIES                      Other Therapeutic Activities/Education:    Reviewed POC and treatment progression expected.     Home Exercise Program:    Self-massage ITB  SLR w/VMO focus  9/24/20:  Dynamic hip stretching (leg swings lateral, leg swings forward/backward)  Static hip stretching with stretch strap, IT band stretch and Hamstring stretch    Manual Treatments:    NA    Modalities:    NA    Communication with other providers:        Assessment:  (Response towards treatment session) (Pain Rating)  Stacey Gilbert with a good response towards today's treatment session. Dynamic IT band and hamstring stretches added in with hip swings. Additionally, static IT and hamstring stretches reviewed using stretch strap. Continue with work on lengthening tight musculature to decrease patellar mal tracking. Assessment: Patient is a 72 y.o. male w/L knee pain. Pt reports left knee pain x >= 15 years which has been worsening over the years. Especially over the last two months and w/certain activities. Pt points directly to left patella tendon and demonstrates increased pain w/standing knee flexion b/t 20 and 30 deg and w/deceleration. Increased pain also w/pivot over planted foot. PLOF:  Patient has been and is independent w/all mobility, self-care, high level physical activities. WORSE:  stairs, getting on bike, deceleration LLE, standing prolonged periods,pain w/standing knee flexion b/t 20 and 30 deg and w/deceleration. Able to cycle up to 26 miles w/o left knee soreness, diffuse mild soreness w/running. Today, patient presents w/lateral tracking bilat patella, related to shortened/tight ITBs, shortened/tight hamstrings bilat, mild weakness L knee, pain w/eccentric loading left quad and will benefit from physical therapy. Plan for Next Session: Specific instructions for Next Treatment: ITB myofascial/STM and stretch, hamstring stretch,quad strengthening/focus VMO, rotary hip add/ext, closed chain hamstring      Time In / Time Out:     2430-1655    If [facility] Please Indicate Time In/Out  CPT Code Time in Time out                                                              Timed Code/Total Treatment Minutes:  43'/43'  43' there ex    Next Progress Note due:   On or before 10/22/20      Plan of Care Interventions:  [x] Therapeutic Exercise  [x] Modalities:  [x] Therapeutic Activity     [x] Ultrasound  [x] Estim  [] Gait Training      [] Cervical Traction [] Lumbar Traction  [x] Neuromuscular Re-education    [x] Cold/hotpack [] Iontophoresis   [x] Instruction in HEP      [x] Vasopneumatic   [x] Dry Needling    [x] Manual Therapy               [] Aquatic Therapy              Electronically signed by:  Vicente Biswas, PT 9/24/2020, 1:46 PM

## 2020-09-29 ENCOUNTER — HOSPITAL ENCOUNTER (OUTPATIENT)
Dept: PHYSICAL THERAPY | Age: 65
Setting detail: THERAPIES SERIES
Discharge: HOME | End: 2020-09-29
Payer: MEDICARE

## 2020-09-29 PROCEDURE — 97110 THERAPEUTIC EXERCISES: CPT

## 2020-09-29 PROCEDURE — 97112 NEUROMUSCULAR REEDUCATION: CPT

## 2020-09-29 NOTE — FLOWSHEET NOTE
Outpatient Physical Therapy  [place]           [x] Phone: [phone]   Fax: [phone]  [place]           [] Phone: [phone]   Fax: [phone]        Physical Therapy Daily Treatment Note  Date:  2020    Patient Name:  Iva Thurston    :  1955  MRN: [id number]  Restrictions/Precautions: Other position/activity restrictions: No formal restrictions  Diagnosis:   Diagnosis: Left leg pain  Date of Injury/Surgery: Chronic  Treatment Diagnosis: Treatment Diagnosis: L knee weakness, symptoms consistent w/patella tendon syndrome    Insurance/Certification information: PT Insurance Information: [place] - $40 co-pay   Referring Physician:  Referring Practitioner: Yin Landa  Next Doctor Visit:   Unknown  Plan of care signed (Y/N):  Yes  Approved for 7 visits from -   Outcome Measure: KOOS 1528 (53.6%)  Visit# / total visits:   2/6  Or as per insurance approval  Pain level: 0/10 (can go up to 7-8/10 w/loading knee)  POC Date Range 20 - 10/22/20     Goals:          Long term goals  Time Frame for Long term goals : In 6 weeks, patient will  Long term goal 1: demonstrate compliance and independence w/HEP. Long term goal 2: score <= 5/28 on KOOS score as indication of improved LE function w/daily activities. Long term goal 3: have no >= 3/10 left knee discomfort w/weight bearing activities for improved LE function (stairs, on/off bike, sit><stand, squatting, kneeling)    Summary of Evaluation: Assessment: Patient is a 72 y.o. male w/L knee pain. Pt reports left knee pain x >= 15 years which has been worsening over the years. Especially over the last two months and w/certain activities. Pt points directly to left patella tendon and demonstrates increased pain w/standing knee flexion b/t 20 and 30 deg and w/deceleration. Increased pain also w/pivot over planted foot. PLOF:  Patient has been and is independent w/all mobility, self-care, high level physical activities.   WORSE: stairs, getting on bike, deceleration LLE, standing prolonged periods,pain w/standing knee flexion b/t 20 and 30 deg and w/deceleration. Able to cycle up to 26 miles w/o left knee soreness, diffuse mild soreness w/running. Today, patient presents w/lateral tracking bilat patella, related to shortened/tight ITBs, shortened/tight hamstrings bilat, mild weakness L knee, pain w/eccentric loading left quad and will benefit from physical therapy. Subjective: Knee may be getting worse, per pt. Noticed knee pain twice w/running (approx 1/4 second two times.)  No residual soreness. Reports \"never\" having knee pain with running before starting therapy. Any changes in Ambulatory Summary Sheet? None        Objective:     Prior to today's treatment session, patient was screened for signs and symptoms related to COVID-19 including but not limited to verbally answering questions related to feeling ill, cough, or SOB, along with taking temperature via forehead thermometer. Patient presented with all negative signs and symptoms and had no fever >100 degrees Fahrenheit this date. Creptitus palpation with B knee flexion and extension  Pain left patella tendon (inferior to patella) w/knee loading (leg press/shuttle)  Pain decreases w/setting adductors while performing leg press/shuttle          Exercises: (No more than 4 columns)   Exercise/Equipment Date 9/22/20 - Evaluation 9/24/20 #2 Date 9/29/20 #3      Await insurance approval.     WARM UP                     TABLE      SLR with VMO  2x10 (tactile and verbal cueing for VMO contraction) 2x10 (tactile and verbal cueing for VMO contraction)  Avoid extreme hip ER.    Supine hamstring stretch  3x30\" hold 1x30\" hold  Already did at home   Supine IT band stretch  3x30\"hold 1x30\" hold  Already did at home   bridges   W/ball b/t knees  2 x 10            STANDING      Dynamic lateral leg stretch (hip swing)  30\", x2 30\" x 2   Dynamic hams/hip flex stretch (hip swing forward and back)  30\", x2 30\" x 2         Hip Adduction BLE  Hip Extension BLE   31.5#  1x10 ea  37.5  1 x 10 ea                            PROPRIOCEPTION      SLS blue foam   Next visit   JOSE CRUZ rivas   Next visit                     MODALITIES                      Other Therapeutic Activities/Education:    Reviewed POC and treatment progression expected. Home Exercise Program:    Self-massage ITB  SLR w/VMO focus  9/24/20:  Dynamic hip stretching (leg swings lateral, leg swings forward/backward)  Static hip stretching with stretch strap, IT band stretch and Hamstring stretch  9/29/20:  Bridge w/ball b/t knees  ITB standing stretch  Hamstring standing stretch    Manual Treatments:    NA    Modalities:    Next visit US patella tendon and lateral knee  3.3 MgHz  1.2 w/cm2  100% DC  10 min      Communication with other providers:    POC faxed to ordering provider     Assessment:  (Response towards treatment session) (Pain Rating)  Val Escalante with a good response towards today's treatment session. Dynamic IT band and hamstring stretches added in with hip swings. Additionally, static IT and hamstring stretches reviewed using stretch strap. Continue with work on lengthening tight musculature to decrease patellar mal tracking. Assessment: Patient is a 72 y.o. male w/L knee pain. Pt reports left knee pain x >= 15 years which has been worsening over the years. Especially over the last two months and w/certain activities. Pt points directly to left patella tendon and demonstrates increased pain w/standing knee flexion b/t 20 and 30 deg and w/deceleration. Increased pain also w/pivot over planted foot. PLOF:  Patient has been and is independent w/all mobility, self-care, high level physical activities. WORSE:  stairs, getting on bike, deceleration LLE, standing prolonged periods,pain w/standing knee flexion b/t 20 and 30 deg and w/deceleration.   Able to cycle up to 26 miles w/o left knee soreness, diffuse mild soreness

## 2020-10-06 ENCOUNTER — HOSPITAL ENCOUNTER (OUTPATIENT)
Dept: PHYSICAL THERAPY | Age: 65
Setting detail: THERAPIES SERIES
Discharge: HOME | End: 2020-10-06
Payer: MEDICARE

## 2020-10-06 PROCEDURE — 97110 THERAPEUTIC EXERCISES: CPT

## 2020-10-06 PROCEDURE — 97112 NEUROMUSCULAR REEDUCATION: CPT

## 2020-10-06 NOTE — FLOWSHEET NOTE
Outpatient Physical Therapy  [place]           [x] Phone: [phone]   Fax: [phone]  [facility]           [] Phone: [phone]   Fax: [phone]        Physical Therapy Daily Treatment Note  Date:  10/6/2020    Patient Name:  Meron Recinos    :  1955  MRN: [id number]  Restrictions/Precautions: Other position/activity restrictions: No formal restrictions  Diagnosis:   Diagnosis: Left leg pain  Date of Injury/Surgery: Chronic  Treatment Diagnosis: Treatment Diagnosis: L knee weakness, symptoms consistent w/patella tendon syndrome    Insurance/Certification information: PT Insurance Information: [place] - $40 co-pay   Referring Physician:  Referring Practitioner: Brittny Rascon  Next Doctor Visit:   Unknown  Plan of care signed (Y/N):  Yes  Approved for 7 visits from -   Outcome Measure: KOOS 15/28 (53.6%)  Visit# / total visits:   3/6  Or as per insurance approval  Pain level: 2/10 getting on bicycle (can go up to 7-8/10 w/loading knee)  POC Date Range 20 - 10/22/20      Goals:          Long term goals  Time Frame for Long term goals : In 6 weeks, patient will  Long term goal 1: demonstrate compliance and independence w/HEP. Long term goal 2: score <= 5/28 on KOOS score as indication of improved LE function w/daily activities. Long term goal 3: have no >= 3/10 left knee discomfort w/weight bearing activities for improved LE function (stairs, on/off bike, sit><stand, squatting, kneeling)    Summary of Evaluation: Assessment: Patient is a 72 y.o. male w/L knee pain. Pt reports left knee pain x >= 15 years which has been worsening over the years. Especially over the last two months and w/certain activities. Pt points directly to left patella tendon and demonstrates increased pain w/standing knee flexion b/t 20 and 30 deg and w/deceleration. Increased pain also w/pivot over planted foot.   PLOF:  Patient has been and is independent w/all mobility, self-care, high level physical activities. WORSE:  stairs, getting on bike, deceleration LLE, standing prolonged periods,pain w/standing knee flexion b/t 20 and 30 deg and w/deceleration. Able to cycle up to 26 miles w/o left knee soreness, diffuse mild soreness w/running. Today, patient presents w/lateral tracking bilat patella, related to shortened/tight ITBs, shortened/tight hamstrings bilat, mild weakness L knee, pain w/eccentric loading left quad and will benefit from physical therapy. Subjective: Pt feels left knee is painful more often since beginning therapy. He spent a lot of time demonstrating his exercise program.  He also notes he has pin-point pain right low back. After discussion, it was determined it may be due to some of his exercises or exercise techniques. Larayne Meckel is also going to a gym and attempting to replicate what is done in this clinic. Any changes in Ambulatory Summary Sheet? None        Objective:     Prior to today's treatment session, patient was screened for signs and symptoms related to COVID-19 including but not limited to verbally answering questions related to feeling ill, cough, or SOB, along with taking temperature via forehead thermometer. Patient presented with all negative signs and symptoms and had no fever >100 degrees Fahrenheit this date. Creptitus palpation with B knee flexion and extension  Pain left patella tendon (inferior to patella) w/knee loading b/t 0 -20 deg knee flexion   Pain decreases w/setting adductors while performing leg press/shuttle          Exercises: (No more than 4 columns)   Exercise/Equipment Date 9/22/20 - Evaluation 9/24/20 #1 Date 9/29/20 #2 10/06/20  #3      Await insurance approval.      WARM UP                        TABLE       SLR with VMO  2x10 (tactile and verbal cueing for VMO contraction) 2x10 (tactile and verbal cueing for VMO contraction)  Avoid extreme hip ER.     Supine hamstring stretch  3x30\" hold 1x30\" hold  Already did at home 1 x 30 sec hold    Trial of sitting HSS performed; pt preferred supine strap     Supine IT band stretch  3x30\"hold 1x30\" hold  Already did at home 2x30\" hold  Continue at home  Discontinue standing ITB stretch   bridges   W/ball b/t knees  2 x 10              STANDING       Dynamic lateral leg stretch (hip swing)  30\", x2 30\" x 2 discontinue   Dynamic hams/hip flex stretch (hip swing forward and back)  30\", x2 30\" x 2 discontinue          Hip Adduction BLE  Hip Extension BLE   31.5#  1x10 ea  37.5  1 x 10 ea   Discussed rationale for these exercises. He states he has not been using hip extension machine at his community gym but will start. PROPRIOCEPTION       SLS blue foam   Next visit    VINODU lungmary   Next visit Pt voiced concerned about lunges as he has pain w/loading knee b/t 0-20 deg. Will attempt next visit. MODALITIES    Cypriot stim                     Other Therapeutic Activities/Education:    Reviewed POC and treatment progression expected. 10/06/20:  Large amount of time was spent reviewing LE HEP w/changes made. Discussed rationale for use of [place] E-stim for VMO muscle re-education. Pt wished to proceed. Home Exercise Program:    Self-massage ITB  SLR w/VMO focus  9/24/20:   Static hip stretching with stretch strap, IT band stretch and Hamstring stretch  9/29/20:  Bridge w/ball b/t knees  10/05/20    ITB and hamstring stretch to be performed supine w/strap  Discontinue dynamic hip stretches d/t back discomfort    Manual Treatments:    NA    Modalities:    Cypriot stim w/active knee extension  Supine w/triangle knee bolster  10/30  10 min  18 milliamperes tolerated/muscle twitch realized    Communication with other providers:    POC faxed to ordering provider     Assessment:  (Response towards treatment session) (Pain Rating)  0/10 pain post session. Pt felt like the therapy session was very constructive. HEP discussed and revised.   Tolerated [place] Stim.    Assessment: Patient is a 72 y.o. male w/L knee pain. Pt reports left knee pain x >= 15 years which has been worsening over the years. Especially over the last two months and w/certain activities. Pt points directly to left patella tendon and demonstrates increased pain w/standing knee flexion b/t 20 and 30 deg and w/deceleration. Increased pain also w/pivot over planted foot. PLOF:  Patient has been and is independent w/all mobility, self-care, high level physical activities. WORSE:  stairs, getting on bike, deceleration LLE, standing prolonged periods,pain w/standing knee flexion b/t 20 and 30 deg and w/deceleration. Able to cycle up to 26 miles w/o left knee soreness, diffuse mild soreness w/running. Today, patient presents w/lateral tracking bilat patella, related to shortened/tight ITBs, shortened/tight hamstrings bilat, mild weakness L knee, pain w/eccentric loading left quad and will benefit from physical therapy. Plan for Next Session: Specific instructions for Next Treatment as time allows and as symptoms dictate:   ITB myofascial/STM and stretch, hamstring stretch,quad strengthening/focus VMO, rotary hip add/ext, closed chain hamstring, US, Swedish      Time In / Time Out:     1445/1550    If [facility] Please Indicate Time In/Out  CPT Code Time in Time out                                                              Timed Code/Total Treatment Minutes:  50'/65'  TE 48' (2), NRE 15' (1)    Next Progress Note due:   On or before 10/22/20      Plan of Care Interventions:  [x] Therapeutic Exercise  [x] Modalities:  [x] Therapeutic Activity     [x] Ultrasound  [x] Estim  [] Gait Training      [] Cervical Traction [] Lumbar Traction  [x] Neuromuscular Re-education    [x] Cold/hotpack [] Iontophoresis   [x] Instruction in HEP      [x] Vasopneumatic   [x] Dry Needling    [x] Manual Therapy               [] Aquatic Therapy              Electronically signed by:  Binu Rick, PT 10/6/2020, 2:49 PM

## 2020-10-13 ENCOUNTER — HOSPITAL ENCOUNTER (OUTPATIENT)
Dept: PHYSICAL THERAPY | Age: 65
Setting detail: THERAPIES SERIES
Discharge: HOME | End: 2020-10-13
Payer: MEDICARE

## 2020-10-13 PROCEDURE — 97110 THERAPEUTIC EXERCISES: CPT

## 2020-10-13 PROCEDURE — 20560 NDL INSJ W/O NJX 1 OR 2 MUSC: CPT

## 2020-10-13 NOTE — FLOWSHEET NOTE
Outpatient Physical Therapy  [place]           [x] Phone: [phone]   Fax: [phone]  [place]           [] Phone: [phone]   Fax: [phone]        Physical Therapy Daily Treatment Note  Date:  10/13/2020    Patient Name:  Mary Mills    :  1955  MRN: [id number]  Restrictions/Precautions: Other position/activity restrictions: No formal restrictions  Diagnosis:   Diagnosis: Left leg pain  Date of Injury/Surgery: Chronic  Treatment Diagnosis: Treatment Diagnosis: L knee weakness, symptoms consistent w/patella tendon syndrome    Insurance/Certification information: PT Insurance Information: [place] - $40 co-pay   Referring Physician:  Referring Practitioner: Emperatriz Mcginnis Doctor Visit:   Unknown  Plan of care signed (Y/N):  Yes  Approved for 7 visits from -   Outcome Measure: KOOS 15/28 (53.6%)  Visit# / total visits:   /  Or as per insurance approval  Pain level: 0/10 getting on bicycle (can go up to 7-8/10 w/loading knee like climbing stairs)  POC Date Range 20 - 10/22/20      Goals:          Long term goals  Time Frame for Long term goals : In 6 weeks, patient will  Long term goal 1: demonstrate compliance and independence w/HEP. Long term goal 2: score <= 5/28 on KOOS score as indication of improved LE function w/daily activities. Long term goal 3: have no >= 3/10 left knee discomfort w/weight bearing activities for improved LE function (stairs, on/off bike, sit><stand, squatting, kneeling)    Summary of Evaluation: Assessment: Patient is a 72 y.o. male w/L knee pain. Pt reports left knee pain x >= 15 years which has been worsening over the years. Especially over the last two months and w/certain activities. Pt points directly to left patella tendon and demonstrates increased pain w/standing knee flexion b/t 20 and 30 deg and w/deceleration. Increased pain also w/pivot over planted foot.   PLOF:  Patient has been and is independent w/all mobility, self-care, high level physical activities. WORSE:  stairs, getting on bike, deceleration LLE, standing prolonged periods,pain w/standing knee flexion b/t 20 and 30 deg and w/deceleration. Able to cycle up to 26 miles w/o left knee soreness, diffuse mild soreness w/running. Today, patient presents w/lateral tracking bilat patella, related to shortened/tight ITBs, shortened/tight hamstrings bilat, mild weakness L knee, pain w/eccentric loading left quad and will benefit from physical therapy. Subjective:  Tonia Nguyen is also going to a gym and attempting to replicate what is done in this clinic. Exercise with resistance can increase pain. For the last week or so, has not been having pain getting on his bike (pain which only started once therapy began.)  Overall, patient is not sensing any improvement in left knee function or pain. Today, patient arrived w/order to assess R knee. Will address at a future date. Any changes in Ambulatory Summary Sheet? None      Objective:     Prior to today's treatment session, patient was screened for signs and symptoms related to COVID-19 including but not limited to verbally answering questions related to feeling ill, cough, or SOB, along with taking temperature via forehead thermometer. Patient presented with all negative signs and symptoms and had no fever >100 degrees Fahrenheit this date.     Creptitus palpation with B knee flexion and extension  Pain left patella tendon (inferior to patella) w/knee loading b/t 0 -20 deg knee flexion   Pain decreases w/setting adductors while performing leg press/shuttle  Gait is non-antalgic  Tight ITB bilat  Feet are outward going w/gait and natural resting/sitting position          Exercises: (No more than 4 columns)   Exercise/Equipment Date 9/22/20 - Evaluation 9/24/20 #1 Date 9/29/20 #2 10/06/20  #3 10/13/20  #4      Await insurance approval.       WARM UP                           TABLE        SLR with VMO  2x10 (tactile and verbal cueing for VMO contraction) 2x10 (tactile and verbal cueing for VMO contraction)  Avoid extreme hip ER. Supine hamstring stretch  3x30\" hold 1x30\" hold  Already did at home 1 x 30 sec hold    Trial of sitting HSS performed; pt preferred supine strap      Supine IT band stretch  3x30\"hold 1x30\" hold  Already did at home 2x30\" hold  Continue at home  Discontinue standing ITB stretch    bridges   W/ball b/t knees  2 x 10                STANDING        Dynamic lateral leg stretch (hip swing)  30\", x2 30\" x 2 discontinue    Dynamic hams/hip flex stretch (hip swing forward and back)  30\", x2 30\" x 2 discontinue            Hip Adduction BLE  Hip Extension BLE   31.5#  1x10 ea  37.5  1 x 10 ea   Discussed rationale for these exercises. He states he has not been using hip extension machine at his community gym but will start. 37.5  1 x 10 ea  R/L                                PROPRIOCEPTION        SLS blue foam   Next visit     JOSE CRUZ lungmary   Next visit Pt voiced concerned about lunges as he has pain w/loading knee b/t 0-20 deg. Will attempt next visit. MODALITIES    [place] stim DNT                       Other Therapeutic Activities/Education:    Reviewed POC and treatment progression expected. 10/06/20:  Large amount of time was spent reviewing LE HEP w/changes made. Discussed rationale for use of [place] E-stim for VMO muscle re-education. Pt wished to proceed. 10/13/20:  Time spent reviewing gluteus medius strengthening, rationale for exercises currently recommended. Home Exercise Program:    Self-massage ITB  SLR w/VMO focus  9/24/20:   Static hip stretching with stretch strap, IT band stretch and Hamstring stretch  9/29/20:  Bridge w/ball b/t knees  10/05/20    ITB and hamstring stretch to be performed supine w/strap  Discontinue dynamic hip stretches d/t back discomfort    Manual Treatments:    NA    Modalities:      DNT:  DNT Acknowledgement form was provided, reviewed and signed. Pt notified that mild needle soreness x 1-2 days may be present and is an expected side effect. Mild bruising at needle site(s) may also occur. Patient wished to proceed w/DNT. Supine w/LEs on bolster. Left TFL/ITB  0.25 x 30 mm      Communication with other providers:    POC faxed to ordering provider     Assessment:  (Response towards treatment session) (Pain Rating)  0/10 pain post session. Soreness during DNT  Tolerated the DNT/no adverse affects. Assessment: Patient is a 72 y.o. male w/L knee pain. Pt reports left knee pain x >= 15 years which has been worsening over the years. Especially over the last two months and w/certain activities. Pt points directly to left patella tendon and demonstrates increased pain w/standing knee flexion b/t 20 and 30 deg and w/deceleration. Increased pain also w/pivot over planted foot. PLOF:  Patient has been and is independent w/all mobility, self-care, high level physical activities. WORSE:  stairs, getting on bike, deceleration LLE, standing prolonged periods,pain w/standing knee flexion b/t 20 and 30 deg and w/deceleration. Able to cycle up to 26 miles w/o left knee soreness, diffuse mild soreness w/running. Today, patient presents w/lateral tracking bilat patella, related to shortened/tight ITBs, shortened/tight hamstrings bilat, mild weakness L knee, pain w/eccentric loading left quad and will benefit from physical therapy. Plan for Next Session: Specific instructions for Next Treatment as time allows and as symptoms dictate:   ITB myofascial/STM and stretch, hamstring stretch,quad strengthening/focus VMO, rotary hip add/ext, closed chain hamstring, US, Japanese vs DNT      Time In / Time Out:     1450/1550    If [facility] Please Indicate Time In/Out  CPT Code Time in Time out                                                              Timed Code/Total Treatment Minutes:  60'/60'   TE 30' (2), DNT 30' (2)    Next Progress Note due:   On or before 10/22/20      Plan of Care Interventions:  [x] Therapeutic Exercise  [x] Modalities:  [x] Therapeutic Activity     [x] Ultrasound  [x] Estim  [] Gait Training      [] Cervical Traction [] Lumbar Traction  [x] Neuromuscular Re-education    [x] Cold/hotpack [] Iontophoresis   [x] Instruction in HEP      [x] Vasopneumatic   [x] Dry Needling    [x] Manual Therapy               [] Aquatic Therapy              Electronically signed by:  Navdeep Pearce PT 10/13/2020, 2:51 PM

## 2020-10-20 ENCOUNTER — HOSPITAL ENCOUNTER (OUTPATIENT)
Dept: PHYSICAL THERAPY | Age: 65
Setting detail: THERAPIES SERIES
Discharge: HOME | End: 2020-10-20
Payer: MEDICARE

## 2020-10-20 PROCEDURE — 97112 NEUROMUSCULAR REEDUCATION: CPT

## 2020-10-20 PROCEDURE — 97110 THERAPEUTIC EXERCISES: CPT

## 2020-10-27 ENCOUNTER — HOSPITAL ENCOUNTER (OUTPATIENT)
Dept: PHYSICAL THERAPY | Age: 65
Setting detail: THERAPIES SERIES
Discharge: HOME | End: 2020-10-27
Payer: MEDICARE

## 2020-10-27 PROCEDURE — 97112 NEUROMUSCULAR REEDUCATION: CPT

## 2020-10-27 PROCEDURE — 97110 THERAPEUTIC EXERCISES: CPT

## 2020-10-27 NOTE — PLAN OF CARE
Outpatient Physical Therapy        [] Phone: [phone]   Fax: [phone]  Physician:          From: Rodo Shultz PT     Patient: Britta Mueller                    : 1955        Date: 10/27/2020    [x]  Progress Note                []  Discharge Note    Total Visits to date:    5   Cancels/No-shows to date:   0    Subjective:    Pt reports left knee \"getting worse\" as he now has mild pain in larger ROM (approx 0-45 deg) as well as pain when rolling over in bed. Pt performs HEP regularly at home and gym. Avid biker, riding b/t 26 and up to 45 miles.         Prominent Objective Findings:    R knee:  No change  0/10 pain today during regular activities, including riding bike 13 miles  Tight ITB, patella tracks laterally     L knee:  Increased pain in larger w/AROM and PROM approx 0-45 deg; 0/10 at rest  Still has max pain patella w/stairs last week (avoids stairs/lunges or any movement that increases pain)    Assessment:    Discussed w/patient possibility of symptoms related to distance bike riding. Pt states he does not have pain while riding. We continue to have lengthy discussion about symptoms, rationale for exercises, causation w/patient usually questioning every aspect of care. Difficult to redirect and gain control of session flow. Quads appear more well defined than at initial evaluation. Will continue conservative management through POC. If no improvement noted, will suggest return to physician.     Goal Status:  [] Achieved [] Partially Achieved  [x] Not Achieved       Changes to goals: No    Planned Services:  [x] Therapeutic Exercise    [] Modalities:  [x] Therapeutic Activity     [x] Ultrasound  [x] Electric Stimulation  [] Gait Training      [] Cervical Traction    [] Lumbar Traction  [x] Neuromuscular Re-education  [x] Cold/hotpack [] Iontophoresis  [x] Instruction in HEP      Other:  [x] Manual Therapy       [x]  Vasopneumatic  [x] Self care management [x] Dry needling trigger point/pain management                ? Plan of Care Date Range:  10/22/20 - 11/21/20 up to a total of 7 visits as approved by insurance. Frequency/Duration:  # Days per week: [x] 1 day # Weeks: [] 1 week [] 4 weeks      [] 2 days? [] 2 weeks [] 5 weeks      [] 3 days   [] 3 weeks [] 6 weeks     Rehab Potential: [] Excellent [] Good [x] Fair  [] Poor     Patient Status: [] Continue per initial Plan of Care     [] Patient now discharged     [x] Additional visits requested, Please re-certify for additional visits:      2 additional visits by 11/21/20    If we are requesting more visits, we fully anticipate the patient's condition is expected to improve within the treatment timeframe we are requesting. Electronically signed by:  Alfred Gooden PT, 10/27/2020, 5:50 PM    If you have any questions or concerns, please don't hesitate to call.   Thank you for your referral.    Physician Signature:______________________ Date:______ Time: ________  By signing above, therapists plan is approved by physician

## 2020-10-27 NOTE — FLOWSHEET NOTE
Outpatient Physical Therapy  [place]           [x] Phone: [phone]   Fax: [phone]  [place]           [] Phone: [phone]   Fax: [phone]        Physical Therapy Daily Treatment Note  Date:  10/27/2020    Patient Name:  Whit Mark    :  1955  MRN: [id number]  Restrictions/Precautions: Other position/activity restrictions: No formal restrictions  Diagnosis:   Diagnosis: Left leg pain  Date of Injury/Surgery: Chronic  Treatment Diagnosis: Treatment Diagnosis: L knee weakness, symptoms consistent w/patella tendon syndrome    Insurance/Certification information: PT Insurance Information: [place] - $40 co-pay   Referring Physician:  Referring Practitioner: Luis Mcginnis Doctor Visit:   Unknown  Plan of care signed (Y/N):  Yes  Approved for 7 visits from -   Outcome Measure: KOOS 15/28 (53.6%)  Visit# / total visits:   5/7  Or as per insurance approval  Pain level: 0/10 at rest (can go up to 8-10/10 w/loading knee climbing stairs, lunges - temporary during the activity)  POC Date Range 20 - 10/22/20   POC Date Range 10/22/20 - 20     Goals:          Long term goals  Time Frame for Long term goals : In 6 weeks, patient will  Long term goal 1: demonstrate compliance and independence w/HEP. Long term goal 2: score <= 5/28 on KOOS score as indication of improved LE function w/daily activities. Long term goal 3: have no >= 3/10 left knee discomfort w/weight bearing activities for improved LE function (stairs, on/off bike, sit><stand, squatting, kneeling)    Summary of Evaluation: Assessment: Patient is a 72 y.o. male w/L knee pain. Pt reports left knee pain x >= 15 years which has been worsening over the years. Especially over the last two months and w/certain activities. Pt points directly to left patella tendon and demonstrates increased pain w/standing knee flexion b/t 20 and 30 deg and w/deceleration. Increased pain also w/pivot over planted foot.   PLOF: more than 4 columns)   Exercise/Equipment 10/06/20  #3 10/13/20  #4 10/20/20  #5 10/27/20  #6            WARM UP                        TABLE       SLR with VMO       Supine hamstring stretch 1 x 30 sec hold    Trial of sitting HSS performed; pt preferred supine strap     1 x 30 sec hold  BLE  Green strap   Supine IT band stretch 2x30\" hold  Continue at home  Discontinue standing ITB stretch   1 x 30 sec hold  BLE  Green strap   bridges    Bridges w/prakash knee using sm ball  1 x 10       Clam#1 w/bridge       1 x 10   Total gym leg press    1 x 10   Hip ext shuttle    1 x 10  BLE          STANDING       Dynamic lateral leg stretch (hip swing) discontinue      Dynamic hams/hip flex stretch (hip swing forward and back) discontinue             Hip Adduction BLE  Hip Extension BLE Discussed rationale for these exercises. He states he has not been using hip extension machine at his community gym but will start. 37.5  1 x 10 ea  R/L  Verbal review. Pt declined performing stating \"I do it at the gym\"                             PROPRIOCEPTION       SLS blue foam       BOSU lunge Pt voiced concerned about lunges as he has pain w/loading knee b/t 0-20 deg. Will attempt next visit. MODALITIES Italian stim DNT [place] Stim  Bilat quads [place] Stim  Bilat quads                     Other Therapeutic Activities/Education:    Reviewed POC and treatment progression expected. 10/06/20:  Large amount of time was spent reviewing LE HEP w/changes made. Discussed rationale for use of [place] E-stim for VMO muscle re-education. Pt wished to proceed. 10/13/20:  Time spent reviewing gluteus medius strengthening, rationale for exercises currently recommended. 10/20/20:  Discussed w/patient possibility of symptoms related to distance bike riding. Pt states he does not have pain while riding.   We continue to have lengthy discussion about symptoms, rationale for exercises, causation w/patient usually questioning every aspect of care. Difficult to redirect and gain control of session flow. Home Exercise Program:    Self-massage ITB  SLR w/VMO focus  9/24/20:   Static hip stretching with stretch strap, IT band stretch and Hamstring stretch  9/29/20:  Bridge w/ball b/t knees  10/05/20    ITB and hamstring stretch to be performed supine w/strap  Discontinue dynamic hip stretches d/t back discomfort    Manual Treatments:    NA    Modalities:    Gabonese stim w/active knee extension  bilat quads  Supine w/triangle knee bolster  10/30  10 min  18 milliamperes      Communication with other providers:    POC faxed to ordering provider     Assessment:  (Response towards treatment session) (Pain Rating)  0/10 pain post session. No discomfort left knee w/Gabonese stim. Quads appear more well defined than at initial evaluation. Will continue conservative management through POC. If no improvement noted, will suggest return to physician. Assessment: Patient is a 72 y.o. male w/L knee pain. Pt reports left knee pain x >= 15 years which has been worsening over the years. Especially over the last two months and w/certain activities. Pt points directly to left patella tendon and demonstrates increased pain w/standing knee flexion b/t 20 and 30 deg and w/deceleration. Increased pain also w/pivot over planted foot. PLOF:  Patient has been and is independent w/all mobility, self-care, high level physical activities. WORSE:  stairs, getting on bike, deceleration LLE, standing prolonged periods,pain w/standing knee flexion b/t 20 and 30 deg and w/deceleration. Able to cycle up to 26 miles w/o left knee soreness, diffuse mild soreness w/running. Today, patient presents w/lateral tracking bilat patella, related to shortened/tight ITBs, shortened/tight hamstrings bilat, mild weakness L knee, pain w/eccentric loading left quad and will benefit from physical therapy.       Plan for Next Session: Specific instructions for Next Treatment as time allows and as symptoms dictate:   ITB myofascial/STM and stretch, hamstring stretch,quad strengthening/focus VMO, rotary hip add/ext, closed chain hamstring, US, [place] vs DNT      Time In / Time Out:  1450/1535    If [facility] Please Indicate Time In/Out  CPT Code Time in Time out                                                              Timed Code/Total Treatment Minutes:  TE 27' (2), NRE 15' (1)    Next Progress Note due:   On or before 11/21/20      Plan of Care Interventions:  [x] Therapeutic Exercise  [x] Modalities:  [x] Therapeutic Activity     [x] Ultrasound  [x] Estim  [] Gait Training      [] Cervical Traction [] Lumbar Traction  [x] Neuromuscular Re-education    [x] Cold/hotpack [] Iontophoresis   [x] Instruction in HEP      [x] Vasopneumatic   [x] Dry Needling    [x] Manual Therapy               [] Aquatic Therapy              Electronically signed by:  Terrence Renae, INEZ 10/27/2020, 2:51 PM

## 2020-11-03 ENCOUNTER — HOSPITAL ENCOUNTER (OUTPATIENT)
Dept: PHYSICAL THERAPY | Age: 65
Setting detail: THERAPIES SERIES
Discharge: HOME | End: 2020-11-03
Payer: MEDICARE

## 2020-11-03 PROCEDURE — 97110 THERAPEUTIC EXERCISES: CPT

## 2020-11-03 NOTE — PLAN OF CARE
Outpatient Physical Therapy        [] Phone: [phone]   Fax: [phone]  Physician:   James Shaw       From: Scot Kennedy PT     Patient: Angel Gooden                    : 1955        Date: 11/3/2020    [x]  Progress Note                []  Discharge Note    Total Visits to date:    4 from 20 - 11/3/20     Cancels/No-shows to date:   0    Subjective:    General left shoulder symptoms starting increasing two weeks ago, peaking one week ago. Today still above baseline from 3 weeks ago and before. Neck pain has decreased since initial evaluation and is currently relatively low. Pt continues to report radicular symptoms into LUE if sleeping on his left side, which he tries to avoid (difficult.)    Prominent Objective Findings:    No scapular winging  Rounded shoulders  Forward head (mild)  Hypomobility lower C-spine/T-spine  Pronounced C-7  TTP left AC joint     L Shoulder AROM supine     Abd 140   IR 75  ER 85 deg     MMT  L shoulder  FF 5/5  Abd 4+/5  ER 4/5  IR 5/5    Assessment:    Mild increased in left AC soft tissue soreness post treatment (did not rate.)  Patient has been seen for 4 visits for left AC joint arthritis/pain from 20 - 20. Treatment of the shoulder was suspended briefly d/t evaluation and treatment of bilat knee pain. Some progress has been made in regard to left shoulder function, however still reporting clicking/clunking and discomfort. Pain that he had been experiencing in his neck has subsided to a degree. Patient will benefit from 2 additional session to monitor response to and execution HEP and address pain as necessary. Patient can become hurried during his home program, demonstrate jerking motions and perform perhaps too aggressively.       Goal Status:  [] Achieved [x] Partially Achieved  [] Not Achieved     Long term goal 1: demonstrate compliance and independence w/HEP. - ONGOING/IMPROVING 20  Long term goal 2: score <= 10% disability on [place] as indication of improved daily mobility and function LUE. - IMPROVED/UNMET 11/03/20  Long term goal 3: report at least 50% improvement w/washing and drying back after shower. Long term goal 4: no longer report radicular symptoms LUE w/daily activities or at night w/sleep. - UNMET 11/03/20     Changes to goals: No    Planned Services:  [x] Therapeutic Exercise    [] Modalities:  [x] Therapeutic Activity     [x] Ultrasound  [x] Electric Stimulation  [] Gait Training      [] Cervical Traction    [] Lumbar Traction  [x] Neuromuscular Re-education  [x] Cold/hotpack [] Iontophoresis  [x] Instruction in HEP      Other:  [x] Manual Therapy       [x]  Vasopneumatic  [x] Self care management                           [x] Dry needling trigger point/pain management                ? Plan of Care Date Range:  8/18/20 - 12/03/20    Frequency/Duration:  # Days per week: [x] 1 day # Weeks: [] 1 week [] 4 weeks      [] 2 days? [] 2 weeks [] 5 weeks      [] 3 days   [] 3 weeks [] 6 weeks     Rehab Potential: [] Excellent [] Good [x] Fair  [] Poor     Patient Status: [] Continue per initial Plan of Care     [] Patient now discharged     [x] Additional visits requested, Please re-certify for additional visits:      2 additional visits by 12/03/20 (insurance approval for 6 visits total)    If we are requesting more visits, we fully anticipate the patient's condition is expected to improve within the treatment timeframe we are requesting. Electronically signed by:  Michele Neal PT, 11/3/2020, 5:20 PM    If you have any questions or concerns, please don't hesitate to call.   Thank you for your referral.    Physician Signature:______________________ Date:______ Time: ________  By signing above, therapists plan is approved by physician

## 2020-11-03 NOTE — FLOWSHEET NOTE
Outpatient Physical Therapy  [place]           [x] Phone: [phone]   Fax: [phone]  [facility]           [] Phone: [phone]   Fax: [phone]        Physical Therapy Daily Treatment Note  Date:  11/3/2020    Patient Name:  Marquis Canavan    :  1955  MRN: [id number]  Restrictions/Precautions: Other position/activity restrictions: No formal restrictions  Diagnosis:   Diagnosis: Arthritis left AC joint  Date of Injury/Surgery: chronic  Treatment Diagnosis: Treatment Diagnosis: LUE pain, weakness, impaired ROM, impaired function    Insurance/Certification information: PT Insurance Information: [place] - $40 co-pay   Referring Physician:  Referring Practitioner: Elvie Mcginnis Doctor Visit:  Unknown  Plan of care signed (Y/N): YES  Outcome Measure: [place] 34% disability  Visit# / total visits:  4/6  (Eval plus 6)  Pain level: 0 /10 at rest   (Patient only approved for 6 visits,  If more visits are needed therapist will have to do a review with [employer]. E stim is not covered.)    POC Date range 20 - 77/21/15 w/recertification. Goals:          Long term goals  Time Frame for Long term goals : In 6 weeks, patient will  Long term goal 1: demonstrate compliance and independence w/HEP. - ONGOING/IMPROVING 20  Long term goal 2: score <= 10% disability on Quick Dash as indication of improved daily mobility and function LUE. - IMPROVED/UNMET 20  Long term goal 3: report at least 50% improvement w/washing and drying back after shower. Long term goal 4: no longer report radicular symptoms LUE w/daily activities or at night w/sleep. - UNMET 20    Summary of Evaluation: Assessment: Patient is a 72 y.o. male w/DX arthritis of left AC joint. Patient reports onset of left shoulder pain which started in 2020 after a biking trip and sleeping on a thin mattress. Injection by Dot Couch helped decreased pain considerably.    Pt also reports left knee chronic pain which he would like addressed. Patient had diagnostics w/x-ray (+) OA left AC joint  Worse:  sleeping on left side, press-ups, pull-ups, riding 10 speed bike. Better:  Cortisone injection, changing positions (resting)  Today, patient presents w/mild left shoulder weakness and ROM limitation, s/s impingement syndrome, radicular symptoms (pins/needles), pain and will benefit from physical therapy. Subjective: General left should symptoms starting increasing two weeks ago, peaking one week ago. Today still above baseline from 3 weeks ago and before. Neck pain has decreased since initial evaluation and is currently relatively low. Pt continues to report radicular symptoms into LUE if sleeping on his left side, which he tries to avoid (difficult.)    Any changes in Ambulatory Summary Sheet? None    Objective:     Prior to today's treatment session, patient was screened for signs and symptoms related to COVID-19 including but not limited to verbally answering questions related to feeling ill, cough, or SOB, along with taking temperature via forehead thermometer. Patient presented with all negative signs and symptoms and had no fever >100 degrees Fahrenheit this date. No scapular winging  Rounded shoulders  Forward head (mild)  Hypomobility lower C-spine/T-spine  Pronounced C-7  TTP left AC joint    L Shoulder AROM supine     Abd 140   IR 75  ER 85 deg    MMT  L shoulder  FF 5/5  Abd 4+/5  ER 4/5  IR 5/5      Exercises: (No more than 4 columns)   Exercise/Equipment Date 8/18/20   Evaluation Date  8/31/2020 #1 Date 9/08/20  #2 Date 9/15/20 #3 Date 11/3/20  #4          Recert completed   WARM UP Evaluation completed. Await Pre-cert.  UBE   3'F  3'B  (late entry 9/15/20 SM) UBE   3'F  3'B  (late entry 9/15/20 SM) UBE   3'F  3'B                       TABLE        Prone FLOR                        Prone flexion    2# hand wt  2 x 10    Prone Abd/straight elbow    2# hand wt  2 x 10    Prone Extension    2# hand wt  2 x 10    Prone Rows   LUE  2 x 10  2# hand wt  Vc/tc required LUE  2 x 10  3# hand wt      Prone ER 90/90    1#  2 x 10    Chin tucks   Towel roll under crown of head  2 x 10 Reviewed Reviewed              STANDING        ER/IR    YTB   Towel under arm  2 x 10 YTB  2 x 10   LAE        Mid row    GTB  2 x 10  Vc/tc required GTB  2 x 10  Vc/tc required Blue TB  2 x 10  Provided black TB for home to advance as able   TRX Dangle    1 x 10 sec    Pec Stretch     Low/mid  2 x 30 sec holds  vc technique  attempted OH stretch; irritated left shoulder - discontinue OH stretch. Sitting caudal stretch     LUE  Lean away  20-30 sec   Sitting Y's     YTB  Only to 90 deg  2 x 10                                PROPRIOCEPTION                                                MODALITIES                            Other Therapeutic Activities/Education:    POC and treatment progression reviewed with and approved by patient. 11/03/20  Neuromuscular Re-education:  VC/TC/Demo for proper technique and TC for muscle recruitment given to ensure maximum therapeutic benefit and functional outcome. Activities performed included: All prone and standing exercises listed above. Reviewed rationale for each exercise, w/respect to physical deficits (AC arthritis and postural changes through C/T spines.)      Home Exercise Program:    Prone shoulder extension, HA, prone rows, standing ER w/ TB, mid row w/ TB,and LAE w/ TB . Given blue and yellow TB  9/8/20:  Added chin tucks in supine  9/15/20:  Added and/or reviewed  Prone shoulder w/2# wt flex, horizontal abd, horizontal abd 90/90 (bucket lift), ER 90/90 1# wt  TRX dangle       Manual Treatments:      Modalities:    NA    Communication with other providers:    POC faxed to ordering provider. Recert faxed to ordering provider.     Assessment:  (Response towards treatment session) (Pain Rating)   Mild increased in left AC soft tissue soreness post treatment (did not rate.)  Patient has been seen for 4 visits for left [facility] joint arthritis/pain from 8/18/20 - 11/03/20. Treatment of the shoulder was suspended briefly d/t evaluation and treatment of bilat knee pain. Some progress has been made in regard to left shoulder function, however still reporting clicking/clunking and discomfort. Pain that he had been experiencing in his neck has subsided to a degree. Patient will benefit from 2 additional session to monitor response to and execution HEP and address pain as necessary. Patient can become hurried during his home program, demonstrate jerking motions and perform perhaps too aggressively. Assessment: Patient is a 72 y.o. male w/DX arthritis of left AC joint. Patient reports onset of left shoulder pain which started in April 2020 after a biking trip and sleeping on a thin mattress. Injection by Loli Romero helped decreased pain considerably. Pt also reports left knee chronic pain which he would like addressed. Patient had diagnostics w/x-ray (+) OA left AC joint  Worse:  sleeping on left side, press-ups, pull-ups, riding 10 speed bike. Better:  Cortisone injection, changing positions (resting)  Today, patient presents w/mild left shoulder weakness and ROM limitation, s/s impingement syndrome, radicular symptoms (pins/needles), pain and will benefit from physical therapy. Plan for Next Session: Specific instructions for Next Treatment: RC strengthening, GH joint mobilization for posterior and caudal glides, initiate HEP, US AC joint and anterior shoulder soft tissue 100%, 1.0 DC, 1.5W/cm2    Develop bullet point list of exercise w/client.       Time In / Time Out:  7040/6758    If [facility] Please Indicate Time In/Out  CPT Code Time in Time out                                                              Timed Code/Total Treatment Minutes:  52'/52'  TE 52' (3)      Next Progress Note due: 12/03/20 (Discharge Expected)    Plan of Care Interventions:  [x] Therapeutic Exercise  [x] Modalities:  [x] Therapeutic Activity     [x] Ultrasound  [x] Estim  [] Gait Training      [] Cervical Traction [] Lumbar Traction  [x] Neuromuscular Re-education    [x] Cold/hotpack [] Iontophoresis   [x] Instruction in HEP      [x] Vasopneumatic   [] Dry Needling    [x] Manual Therapy               [] Aquatic Therapy              Electronically signed by:  Ray Mcdermott, PT 11/3/2020, 1:40 PM       11/3/2020,1:40 PM

## 2020-11-11 NOTE — FLOWSHEET NOTE
Patients Plan of Care was received and signed. Signed POC was scanned and placed in the patients chart.     German Arreaga

## 2020-11-18 NOTE — FLOWSHEET NOTE
Patients Plan of Care was received and signed. Signed POC was scanned and placed in the patients chart.     Maddy Knapp

## 2020-11-19 ENCOUNTER — HOSPITAL ENCOUNTER (OUTPATIENT)
Dept: PHYSICAL THERAPY | Age: 65
Setting detail: THERAPIES SERIES
Discharge: HOME | End: 2020-11-19
Payer: MEDICARE

## 2020-11-19 ENCOUNTER — OFFICE VISIT (OUTPATIENT)
Dept: ORTHOPEDIC SURGERY | Age: 65
End: 2020-11-19
Payer: MEDICARE

## 2020-11-19 VITALS — HEIGHT: 70 IN | WEIGHT: 195 LBS | BODY MASS INDEX: 27.92 KG/M2 | RESPIRATION RATE: 15 BRPM

## 2020-11-19 PROCEDURE — 20610 DRAIN/INJ JOINT/BURSA W/O US: CPT | Performed by: ORTHOPAEDIC SURGERY

## 2020-11-19 PROCEDURE — 97110 THERAPEUTIC EXERCISES: CPT

## 2020-11-19 PROCEDURE — 99213 OFFICE O/P EST LOW 20 MIN: CPT | Performed by: ORTHOPAEDIC SURGERY

## 2020-11-19 SDOH — HEALTH STABILITY: MENTAL HEALTH: HOW OFTEN DO YOU HAVE A DRINK CONTAINING ALCOHOL?: 2-4 TIMES A MONTH

## 2020-11-19 NOTE — PROGRESS NOTES
Pt returns to the office today for left shoulder injection 8/6/20. Pt states pain today is a 3/10. He has been doing physical which is helping with his ROM. Pt states he does hear a lot of clicking in the shoulder with movement. Pt states that the injection did help about 6 weeks. Pt states he does notice his neck pain is much better since he has been working with therapy.

## 2020-11-19 NOTE — PATIENT INSTRUCTIONS
Continue weight-bearing as tolerated. Continue range of motion exercises as instructed. Ice and elevate as needed. Tylenol or Motrin for pain.   Steroid injection give today in the [facility] joint   Follow up as needed for another injection

## 2020-11-19 NOTE — FLOWSHEET NOTE
Outpatient Physical Therapy  [place]           [x] Phone: [phone]   Fax: [phone]  [place]           [] Phone: [phone]   Fax: [phone]        Physical Therapy Daily Treatment Note  Date:  2020    Patient Name:  Angel Gooden    :  1955  MRN: [id number]  Restrictions/Precautions: Other position/activity restrictions: No formal restrictions  Diagnosis:   Diagnosis: Arthritis left AC joint  Date of Injury/Surgery: chronic  Treatment Diagnosis: Treatment Diagnosis: LUE pain, weakness, impaired ROM, impaired function    Insurance/Certification information: PT Insurance Information: [place] - $40 co-pay   Referring Physician:  Referring Practitioner: James Shaw  Next Doctor Visit:  Unknown  Plan of care signed (Y/N): YES  Outcome Measure: Quick Dash 34% disability  Visit# / total visits:  5/6  (Eval plus 6)  Pain level: 0 /10 at rest   (Patient only approved for 6 visits,  If more visits are needed therapist will have to do a review with [employer]. E stim is not covered.)    POC Date range 20 -  w/recertification. Goals:          Long term goals  Time Frame for Long term goals : In 6 weeks, patient will  Long term goal 1: demonstrate compliance and independence w/HEP. - ONGOING/IMPROVING 20  Long term goal 2: score <= 10% disability on Quick Dash as indication of improved daily mobility and function LUE. - IMPROVED/UNMET 20  Long term goal 3: report at least 50% improvement w/washing and drying back after shower. Long term goal 4: no longer report radicular symptoms LUE w/daily activities or at night w/sleep. - UNMET 20    Summary of Evaluation: Assessment: Patient is a 72 y.o. male w/DX arthritis of left AC joint. Patient reports onset of left shoulder pain which started in 2020 after a biking trip and sleeping on a thin mattress. Injection by Autumn Carroll helped decreased pain considerably.    Pt also reports left knee chronic pain which he would like addressed. Patient had diagnostics w/x-ray (+) OA left AC joint  Worse:  sleeping on left side, press-ups, pull-ups, riding 10 speed bike. Better:  Cortisone injection, changing positions (resting)  Today, patient presents w/mild left shoulder weakness and ROM limitation, s/s impingement syndrome, radicular symptoms (pins/needles), pain and will benefit from physical therapy. Subjective:   11/03/20:  General left should symptoms starting increasing two weeks ago, peaking one week ago. Today still above baseline from 3 weeks ago and before. Neck pain has decreased since initial evaluation and is currently relatively low. Pt continues to report radicular symptoms into LUE if sleeping on his left side, which he tries to avoid (difficult.)    11/19/20:  Pt arrived to appointment immediately following left shoulder injection by orthopedic specialist Carmelo Chau MD)  Discussed w/patient the need to keep any exercise today at a minimum/pt wished to proceed as he felt it important to at least verbally review his exercises. Any changes in Ambulatory Summary Sheet? None    Objective:     Prior to today's treatment session, patient was screened for signs and symptoms related to COVID-19 including but not limited to verbally answering questions related to feeling ill, cough, or SOB, along with taking temperature via forehead thermometer. Patient presented with all negative signs and symptoms and had no fever >100 degrees Fahrenheit this date. 11/03/20:  No scapular winging  Rounded shoulders  Forward head (mild)  Hypomobility lower C-spine/T-spine  Pronounced C-7  TTP left AC joint    L Shoulder AROM supine     Abd 140   IR 75  ER 85 deg    MMT  L shoulder  FF 5/5  Abd 4+/5  ER 4/5  IR 5/5    11/19/20:  Pt performs HEP w/mild \"popping and clicking\" L shoulder, mild soreness. Moves left shoulder through functional arom w/o grimace or compensatory strategy.       Exercises: (No progression reviewed with and approved by patient. 11/19/20:  Neuromuscular Re-education:  VC/TC/Demo for proper technique and TC for muscle recruitment given to ensure maximum therapeutic benefit and functional outcome. Activities performed included: All prone and standing exercises listed above. Reviewed rationale for each exercise, w/respect to physical deficits (AC arthritis and postural changes through C/T spines.)  Pt voiced concern over amount of time he spends on HEP (with LE's exercises included.)    Therapist discussed the most important exercises for shoulder are pec stretch (low and mid), scapular retraction, shoulder IR/ER, chin tucks. Home Exercise Program:    Prone shoulder extension, HA, prone rows, standing ER w/ TB, mid row w/ TB,and LAE w/ TB . Given blue and yellow TB  9/8/20:  Added chin tucks in supine  9/15/20:  Added and/or reviewed  Prone shoulder w/2# wt flex, horizontal abd, horizontal abd 90/90 (bucket lift), ER 90/90 1# wt  TRX dangle       Manual Treatments:    NA    Modalities:    NA    Communication with other providers:    POC faxed to ordering provider. Recert faxed to ordering provider. Assessment:  (Response towards treatment session) (Pain Rating)   11/03/20:  Mild increased in left AC soft tissue soreness post treatment (did not rate.)  Patient has been seen for 4 visits for left [facility] joint arthritis/pain from 8/18/20 - 11/03/20. Treatment of the shoulder was suspended briefly d/t evaluation and treatment of bilat knee pain. Some progress has been made in regard to left shoulder function, however still reporting clicking/clunking and discomfort. Pain that he had been experiencing in his neck has subsided to a degree. Patient will benefit from 2 additional session to monitor response to and execution HEP and address pain as necessary. Patient can become hurried during his home program, demonstrate jerking motions and perform perhaps too aggressively. 11/19/20:  Pt appeared satisfied w/his session today. Seems to have a better handle on exercises and techniques. Pt feels he may not need any further treatment at this point, but would like to keep option open for 1 last session depending on how he does over the next couple weeks. No increase in pain verbalized post-tx. Assessment: Patient is a 72 y.o. male w/DX arthritis of left AC joint. Patient reports onset of left shoulder pain which started in April 2020 after a biking trip and sleeping on a thin mattress. Injection by Toribio Rodarte helped decreased pain considerably. Pt also reports left knee chronic pain which he would like addressed. Patient had diagnostics w/x-ray (+) OA left AC joint  Worse:  sleeping on left side, press-ups, pull-ups, riding 10 speed bike. Better:  Cortisone injection, changing positions (resting)  Today, patient presents w/mild left shoulder weakness and ROM limitation, s/s impingement syndrome, radicular symptoms (pins/needles), pain and will benefit from physical therapy.       Plan for Next Session: Specific instructions for Next Treatment: RC strengthening, GH joint mobilization for posterior and caudal glides, initiate HEP, US AC joint and anterior shoulder soft tissue 100%, 1.0 DC, 1.5W/cm2 prn    Time In / Time Out:  1450/1530    If BWC Please Indicate Time In/Out  CPT Code Time in Time out                                                              Timed Code/Total Treatment Minutes:  40'/40'  TE 40' (3)      Next Progress Note due: 12/03/20 (Discharge Expected)    Plan of Care Interventions:  [x] Therapeutic Exercise  [x] Modalities:  [x] Therapeutic Activity     [x] Ultrasound  [x] Estim  [] Gait Training      [] Cervical Traction [] Lumbar Traction  [x] Neuromuscular Re-education    [x] Cold/hotpack [] Iontophoresis   [x] Instruction in HEP      [x] Vasopneumatic   [] Dry Needling    [x] Manual Therapy               [] Aquatic Therapy              Electronically signed by:  Kyaw Alva, PT 11/19/2020, 5:38 PM       11/19/2020,5:38 PM

## 2020-12-02 ASSESSMENT — ENCOUNTER SYMPTOMS
SHORTNESS OF BREATH: 0
COLOR CHANGE: 0
CHEST TIGHTNESS: 0

## 2020-12-02 NOTE — PROGRESS NOTES
11/19/2020   Chief Complaint   Patient presents with    Shoulder Pain     left shoulder inj 8/6/20        History of Present Illness:                             Edy Epps is a 72 y.o. male who returns today for follow-up of his left shoulder. The injection performed his acromioclavicular joint the last visit did provide pain relief and allowed him to resume his exercise activities. The pain relief lasted for about 6 weeks but has subsequently worn off. He has been working with physical therapy and has noticed some improvement with his range of motion and strength at the shoulder. He continues to have aching pain primarily along the anterior aspect of his acromioclavicular joint and worse with heavy lifting or reaching away from his body or laying on that side. Pt returns to the office today for left shoulder injection 8/6/20. Pt states pain today is a 3/10. He has been doing physical which is helping with his ROM. Pt states he does hear a lot of clicking in the shoulder with movement. Pt states that the injection did help about 6 weeks. Pt states he does notice his neck pain is much better since he has been working with therapy. Medical History  Patient's medications, allergies, past medical, surgical, social and family histories were reviewed and updated as appropriate. Review of Systems:   Review of Systems   Constitutional: Negative for activity change and fever. Respiratory: Negative for chest tightness and shortness of breath. Cardiovascular: Negative for chest pain. Skin: Negative for color change. Neurological: Negative for dizziness. Psychiatric/Behavioral: The patient is not nervous/anxious. Examination:  General Exam:  Vitals: Resp 15   Ht 5' 10\" (1.778 m)   Wt 195 lb (88.5 kg)   BMI 27.98 kg/m²    Physical Exam  Vitals signs and nursing note reviewed. Constitutional:       Appearance: Normal appearance.    HENT: normal.                  Office Procedures:  Orders Placed This Encounter   Procedures    HI ARTHROCENTESIS ASPIR&/INJ MAJOR JT/BURSA W/O US       Assessment and Plan  1. Left acromioclavicular joint primary osteoarthritis     2. Left shoulder impingement syndrome     His symptoms are consistent with ongoing degenerative joint disease at the acromioclavicular joint and persistent impingement syndrome with rotator cuff tendinitis.     I have encouraged him to continue working with physical therapy and transition to home exercise program for shoulder stretching and rotator cuff strengthening     Due to his ongoing pain level I recommended a repeat injection to the left shoulder. Procedure Note, Left Shoulder Subacromial Injection:  The left shoulder was prepped with alcohol and then the subacromial space and anterior acromioclavicular joint was injected with 40 mg of Kenalog and 4 mL of 1% plain lidocaine using a 25-gauge needle. Sterile Band-Aid was applied. The patient tolerated it well with no complications. I have instructed the patient on basic stretching exercises and we discussed activity modification and avoidance of strenuous activities at the shoulder. We discussed the use of over-the-counter anti-inflammatory medications as needed. If his symptoms are not improving we will proceed with an MRI to evaluate for rotator cuff or intra-articular pathology. He will call if his symptoms do not respond to this conservative treatment plan over the next 4 to 6 weeks.   We can order the MRI prior to his next visit.           Electronically signed by Cecilia Ahn MD on 12/2/2020 at 7:19 AM

## 2021-06-08 ENCOUNTER — OFFICE VISIT (OUTPATIENT)
Dept: ORTHOPEDIC SURGERY | Age: 66
End: 2021-06-08
Payer: MEDICARE

## 2021-06-08 VITALS
HEART RATE: 84 BPM | BODY MASS INDEX: 27.92 KG/M2 | HEIGHT: 70 IN | RESPIRATION RATE: 15 BRPM | WEIGHT: 195 LBS | OXYGEN SATURATION: 97 %

## 2021-06-08 DIAGNOSIS — M19.012 OSTEOARTHRITIS, LOCALIZED, SHOULDER, LEFT: ICD-10-CM

## 2021-06-08 DIAGNOSIS — M19.012 ARTHRITIS OF LEFT ACROMIOCLAVICULAR JOINT: ICD-10-CM

## 2021-06-08 DIAGNOSIS — M17.12 PRIMARY OSTEOARTHRITIS OF LEFT KNEE: Primary | ICD-10-CM

## 2021-06-08 PROCEDURE — 99214 OFFICE O/P EST MOD 30 MIN: CPT | Performed by: ORTHOPAEDIC SURGERY

## 2021-06-08 PROCEDURE — 3017F COLORECTAL CA SCREEN DOC REV: CPT | Performed by: ORTHOPAEDIC SURGERY

## 2021-06-08 PROCEDURE — 4004F PT TOBACCO SCREEN RCVD TLK: CPT | Performed by: ORTHOPAEDIC SURGERY

## 2021-06-08 PROCEDURE — 1123F ACP DISCUSS/DSCN MKR DOCD: CPT | Performed by: ORTHOPAEDIC SURGERY

## 2021-06-08 PROCEDURE — G8417 CALC BMI ABV UP PARAM F/U: HCPCS | Performed by: ORTHOPAEDIC SURGERY

## 2021-06-08 PROCEDURE — G8427 DOCREV CUR MEDS BY ELIG CLIN: HCPCS | Performed by: ORTHOPAEDIC SURGERY

## 2021-06-08 PROCEDURE — 4040F PNEUMOC VAC/ADMIN/RCVD: CPT | Performed by: ORTHOPAEDIC SURGERY

## 2021-06-08 RX ORDER — AMLODIPINE BESYLATE 2.5 MG/1
TABLET ORAL
COMMUNITY
Start: 2021-04-19

## 2021-06-08 NOTE — PROGRESS NOTES
Patient presents to the office today for Bilateral knee pain. Patient states L>R. Pt states pain today in the left knee is a 1/10 and right knee 1/10. pt states that he has had on going pain in the bilateral knees for a few years now. Pt states last week he was hiking when he jumped off a branch and landed directly on another log and felt instant pain in the left knee. Pt states that pain is globally in the patella. Pt states he has been working with therapy on the bilateral knees which has not helped with his pain. Pt states he is having a difficult time going up and down the stairs do to the sharp stabbing pain in the left knee.  Pt states he is having a difficult time riding his bike do to the pain in the left knee

## 2021-06-08 NOTE — PATIENT INSTRUCTIONS
Continue weight-bearing as tolerated. Continue range of motion exercises as instructed. Ice and elevate as needed. Tylenol or Motrin for pain. MRI of the left shoulder ordered today   Follow up once MRI is completed  Central scheduling [phone] will be calling you to schedule your MRI. If you do not hear from them with in a week give them a call.

## 2021-06-08 NOTE — PROGRESS NOTES
6/8/2021   Chief Complaint   Patient presents with    Knee Pain     left knee pain         History of Present Illness:                             Steve Sanchez is a 77 y.o. male who presents today for evaluation of his left knee pain and further discussion of his ongoing left shoulder issues. He has had many years of bilateral knee pain but recently has been treated by his primary care physician regarding increasing soreness in the left knee after an injury while hiking. He jumped down awkwardly and landed forcefully onto his left leg and had a sharp pain along the left knee. Since that time he has had discomfort and mild swelling at the anterior lateral aspect of his knee along the patellofemoral joint. He has had discomfort and difficulty keeping up with his exercise activities. Patient presents to the office today for Bilateral knee pain. Patient states L>R. Pt states pain today in the left knee is a 1/10 and right knee 1/10. pt states that he has had on going pain in the bilateral knees for a few years now. Pt states last week he was hiking when he jumped off a branch and landed directly on another log and felt instant pain in the left knee. Pt states that pain is globally in the patella. Pt states he has been working with therapy on the bilateral knees which has not helped with his pain. Pt states he is having a difficult time going up and down the stairs do to the sharp stabbing pain in the left knee. Pt states he is having a difficult time riding his bike do to the pain in the left knee      He has continued to have pain and discomfort of the left shoulder despite appropriate conservative measures. He is having pain with reaching overhead or lifting pushing pulling activities. Pain is sharp along the superior and anterior aspects of the shoulder and worse with repetitive activities.     Medical History  Patient's medications, allergies, past medical, surgical, social and family histories were reviewed and updated as appropriate. No past medical history on file. No past surgical history on file. No family history on file. Social History     Socioeconomic History    Marital status: Single     Spouse name: Not on file    Number of children: Not on file    Years of education: Not on file    Highest education level: Not on file   Occupational History    Not on file   Tobacco Use    Smoking status: Never Smoker   Substance and Sexual Activity    Alcohol use: Yes    Drug use: Not Currently    Sexual activity: Yes   Other Topics Concern    Not on file   Social History Narrative    Not on file     Social Determinants of Health     Financial Resource Strain:     Difficulty of Paying Living Expenses:    Food Insecurity:     Worried About Running Out of Food in the Last Year:     [address] in the Last Year:    Transportation Needs:     Lack of Transportation (Medical):      Lack of Transportation (Non-Medical):    Physical Activity:     Days of Exercise per Week:     Minutes of Exercise per Session:    Stress:     Feeling of Stress :    Social Connections:     Frequency of Communication with Friends and Family:     Frequency of Social Gatherings with Friends and Family:     Attends [employer] Services:     Active Member of Clubs or Organizations:     Attends Club or Organization Meetings:     Marital Status:    Intimate Partner Violence:     Fear of Current or Ex-Partner:     Emotionally Abused:     Physically Abused:     Sexually Abused:      Current Outpatient Medications   Medication Sig Dispense Refill    amLODIPine (NORVASC) 2.5 MG tablet       temazepam (RESTORIL) 15 MG capsule       pantoprazole (PROTONIX) 40 MG tablet       RESTASIS 0.05 % ophthalmic emulsion       Ingenol Mebutate (PICATO) 0.05 % GEL Picato 0.05 % topical gel   APPLY TO THE AFFECTED AREA(S) OF THE  BY TOPICAL ROUTE ONCE DAILY FOR 2 CONSECUTIVE DAYS TO FACE      clomiPHENE Citrate (CLOMID PO) Take 50 mg by mouth      doxepin (SINEQUAN) 10 MG capsule Take 10 mg by mouth nightly      carbachol (MIOSTAT) 0.01 % ophthalmic solution 0.5 mLs once      minocycline (MINOCIN;DYNACIN) 50 MG capsule Take 50 mg by mouth daily       No current facility-administered medications for this visit. Allergies   Allergen Reactions    Metoprolol Other (See Comments)     Ringing in ears and dropped heat rate 5 points     Epinephrine      Other reaction(s): Tachycardia       Review of Systems:   Review of Systems   Constitutional: Negative for chills and fever. HENT: Negative for congestion and sneezing. Eyes: Negative for pain and redness. Respiratory: Negative for chest tightness, shortness of breath and wheezing. Cardiovascular: Negative for chest pain and palpitations. Gastrointestinal: Negative for vomiting. Musculoskeletal: Positive for arthralgias. Skin: Negative for color change and rash. Neurological: Negative for weakness and numbness. Psychiatric/Behavioral: Negative for agitation. The patient is not nervous/anxious. Examination:  General Exam:  Vitals: Pulse 84   Resp 15   Ht 5' 10\" (1.778 m)   Wt 195 lb (88.5 kg)   SpO2 97%   BMI 27.98 kg/m²    Physical Exam  Vitals and nursing note reviewed. Constitutional:       Appearance: Normal appearance. HENT:      Head: Normocephalic and atraumatic. Eyes:      Conjunctiva/sclera: Conjunctivae normal.      Pupils: Pupils are equal, round, and reactive to light. Pulmonary:      Effort: Pulmonary effort is normal.   Musculoskeletal:      Right shoulder: No swelling, deformity, laceration, tenderness or bony tenderness. Normal range of motion. Normal strength. Right elbow: Normal.      Left elbow: Normal. No swelling, deformity, effusion or lacerations. Normal range of motion. No tenderness. Cervical back: Normal range of motion.       Right hip: Normal.      Left hip: No deformity, tenderness, bony tenderness or crepitus. Normal range of motion. Normal strength. Right knee: No swelling, deformity, effusion, erythema, ecchymosis or bony tenderness. Normal range of motion. No medial joint line or lateral joint line tenderness. No LCL laxity or MCL laxity. Normal alignment and normal patellar mobility. Comments: Left Lower Extremity:    There is mild diffuse tenderness to palpation throughout the knee maximally along the anterior lateral joint line at the patellofemoral joint. There is no global swelling anteriorly at the knee with no obvious effusion. There is 5 out of 5 strength with knee flexion and extension. Sensation is intact throughout the lower extremity. There is full range of motion at the knee with discomfort at the extremes of motion, especially terminal flexion. No instability with varus or valgus stress testing or anterior/posterior drawer testing. Negative medial and lateral Brandee's test.  Skin is intact. Normal knee alignment. Pulses intact. Left Upper Extremity:    There is mild global tenderness throughout the shoulder most significant at the anterior lateral aspect and overlying the acromioclavicular joint. There is mildly restricted range of motion of the shoulder with pain at the extremes of forward elevation and abduction. Well-healed surgical scar over the superior aspect of the clavicle. Forward elevation 140 degrees, abduction 120 degrees, internal rotation to L5, external rotation 30 degrees. Strength is 5/5 with rotator cuff testing but there is breakaway due to pain. Positive empty can sign. Negative drop arm sign. Positive Neer sign. Positive Babin sign. Mild pain at the acromioclavicular joint with crossarm test.  No crepitation. Mild clicking at the shoulder with overhead rotational motion. Skin is intact. Sensation is intact throughout the upper extremity. No instability with passive motion of the shoulder.   2+ radial pulse. No edema. No muscle atrophy. Normal scapular motion. Skin:     General: Skin is warm and dry. Neurological:      Mental Status: He is alert and oriented to person, place, and time. Psychiatric:         Mood and Affect: Mood normal.         Behavior: Behavior normal.            Diagnostic testing:  X-ray images were reviewed by myself and discussed with the patient:    XR KNEE LEFT (MIN 4 VIEWS)    Result Date: 6/8/2021  4 views of the left knee show evidence of minimal degenerative changes with mild spurring of the lateral patella on the sunrise view and minimal joint space narrowing at the medial compartment. Normal alignment. Normal bone density. No fracture or acute abnormality. No joint effusion. Impression: Minimal degenerative changes    Office Procedures:  Orders Placed This Encounter   Procedures    MRI SHOULDER LEFT WO CONTRAST     Standing Status:   Future     Standing Expiration Date:   6/8/2022       Assessment and Plan  1. Left knee mild primary osteoarthritis    2. Left shoulder acromioclavicular joint osteoarthritis and impingement syndrome      We additionally discussed his ongoing left shoulder discomfort. He has failed conservative treatments with injection, physical therapy, rest, and anti-inflammatory medications. Due to his ongoing pain and discomfort I recommended we proceed with an MRI to evaluate for intra-articular pathology of the shoulder as well as rotator cuff pathology. We discussed his ongoing shoulder discomfort despite appropriate treatments with injection and therapy. Given his ongoing symptoms I have recommended that we proceed with an MRI to evaluate for any rotator cuff pathology that may be responsible for his ongoing symptoms. He will follow-up after the MRI is complete for review the results. We reviewed the x-ray findings of his knee which show mild degenerative issues primarily at the lateral patellofemoral joint.  I recommended that conservative treatments for this and I have recommended a steroid injection. He would like to proceed but is currently scheduled to have surgery tomorrow morning and to affect his ability to proceed with surgery. Therefore I have recommended that we proceed with knee injection at the time of his review of the MRI for his left shoulder. Continue with his home therapy exercise and anti-inflammatory medications as needed.     Electronically signed by Isaias Sorensen MD on 6/8/2021 at 12:02 PM

## 2021-06-09 ASSESSMENT — ENCOUNTER SYMPTOMS
WHEEZING: 0
EYE PAIN: 0
EYE REDNESS: 0
VOMITING: 0
SHORTNESS OF BREATH: 0
COLOR CHANGE: 0
CHEST TIGHTNESS: 0

## 2021-06-18 ENCOUNTER — HOSPITAL ENCOUNTER (OUTPATIENT)
Dept: MRI IMAGING | Age: 66
Discharge: HOME | End: 2021-06-18
Payer: MEDICARE

## 2021-06-18 DIAGNOSIS — M19.012 ARTHRITIS OF LEFT ACROMIOCLAVICULAR JOINT: ICD-10-CM

## 2021-06-18 PROCEDURE — 73221 MRI JOINT UPR EXTREM W/O DYE: CPT

## 2021-07-08 ENCOUNTER — OFFICE VISIT (OUTPATIENT)
Dept: ORTHOPEDIC SURGERY | Age: 66
End: 2021-07-08
Payer: MEDICARE

## 2021-07-08 VITALS
HEIGHT: 70 IN | HEART RATE: 64 BPM | WEIGHT: 195 LBS | RESPIRATION RATE: 16 BRPM | BODY MASS INDEX: 27.92 KG/M2 | OXYGEN SATURATION: 95 %

## 2021-07-08 DIAGNOSIS — M19.012 ARTHRITIS OF LEFT ACROMIOCLAVICULAR JOINT: Primary | ICD-10-CM

## 2021-07-08 DIAGNOSIS — M75.42 IMPINGEMENT SYNDROME, SHOULDER, LEFT: ICD-10-CM

## 2021-07-08 DIAGNOSIS — M17.12 PRIMARY OSTEOARTHRITIS OF LEFT KNEE: ICD-10-CM

## 2021-07-08 DIAGNOSIS — Z09 FOLLOW-UP EXAM: ICD-10-CM

## 2021-07-08 PROCEDURE — 1123F ACP DISCUSS/DSCN MKR DOCD: CPT | Performed by: ORTHOPAEDIC SURGERY

## 2021-07-08 PROCEDURE — 4040F PNEUMOC VAC/ADMIN/RCVD: CPT | Performed by: ORTHOPAEDIC SURGERY

## 2021-07-08 PROCEDURE — G8417 CALC BMI ABV UP PARAM F/U: HCPCS | Performed by: ORTHOPAEDIC SURGERY

## 2021-07-08 PROCEDURE — 99214 OFFICE O/P EST MOD 30 MIN: CPT | Performed by: ORTHOPAEDIC SURGERY

## 2021-07-08 PROCEDURE — 3017F COLORECTAL CA SCREEN DOC REV: CPT | Performed by: ORTHOPAEDIC SURGERY

## 2021-07-08 PROCEDURE — 1036F TOBACCO NON-USER: CPT | Performed by: ORTHOPAEDIC SURGERY

## 2021-07-08 PROCEDURE — G8427 DOCREV CUR MEDS BY ELIG CLIN: HCPCS | Performed by: ORTHOPAEDIC SURGERY

## 2021-07-08 PROCEDURE — 20610 DRAIN/INJ JOINT/BURSA W/O US: CPT | Performed by: ORTHOPAEDIC SURGERY

## 2021-07-08 NOTE — PROGRESS NOTES
Patient is in the office today for a follow up for the left shoulder MRI results and a steroid injection into the left knee. Patient states that the pain is about the same in both body parts.

## 2021-07-08 NOTE — PATIENT INSTRUCTIONS
Continue to weight bear as tolerated  Continue range of motion  Ice and elevate as needed  Tylenol or Motrin for pain  Injection given today in the office into the left knee  Rest the left knee for 24 to 48 hours do not do any extreme activities  Follow up as needed

## 2021-07-11 ASSESSMENT — ENCOUNTER SYMPTOMS
COLOR CHANGE: 0
CHEST TIGHTNESS: 0
SHORTNESS OF BREATH: 0

## 2021-07-12 NOTE — PROGRESS NOTES
7/8/2021   Chief Complaint   Patient presents with    Shoulder Pain     left    Knee Pain     left -- wants injection        History of Present Illness:                             Whit Mark is a 77 y.o. male returns today for follow-up of his left knee pain and follow-up of his left shoulder pain after his previous MRI. His knee is about the same. He would like to proceed with steroid injection as previously discussed. His left shoulder symptoms are unchanged. He recently had his MRI and is here today for a review of the results. Patient is in the office today for a follow up for the left shoulder MRI results and a steroid injection into the left knee. Patient states that the pain is about the same in both body parts. Medical History  Patient's medications, allergies, past medical, surgical, social and family histories were reviewed and updated as appropriate. No past medical history on file. No past surgical history on file. No family history on file. Social History     Socioeconomic History    Marital status: Single     Spouse name: None    Number of children: None    Years of education: None    Highest education level: None   Occupational History    None   Tobacco Use    Smoking status: Never Smoker    Smokeless tobacco: Never Used   Substance and Sexual Activity    Alcohol use: Yes    Drug use: Not Currently    Sexual activity: Yes   Other Topics Concern    None   Social History Narrative    None     Social Determinants of Health     Financial Resource Strain:     Difficulty of Paying Living Expenses:    Food Insecurity:     Worried About Running Out of Food in the Last Year:     Ran Out of Food in the Last Year:    Transportation Needs:     Lack of Transportation (Medical):      Lack of Transportation (Non-Medical):    Physical Activity:     Days of Exercise per Week:     Minutes of Exercise per Session:    Stress:     Feeling of Stress :    Social Connections:     Frequency of Communication with Friends and Family:     Frequency of Social Gatherings with Friends and Family:     Attends Bahai Services:     Active Member of Clubs or Organizations:     Attends Club or Organization Meetings:     Marital Status:    Intimate Partner Violence:     Fear of Current or Ex-Partner:     Emotionally Abused:     Physically Abused:     Sexually Abused:      Current Outpatient Medications   Medication Sig Dispense Refill    amLODIPine (NORVASC) 2.5 MG tablet       temazepam (RESTORIL) 15 MG capsule       pantoprazole (PROTONIX) 40 MG tablet       RESTASIS 0.05 % ophthalmic emulsion       Ingenol Mebutate (PICATO) 0.05 % GEL Picato 0.05 % topical gel   APPLY TO THE AFFECTED AREA(S) OF THE  BY TOPICAL ROUTE ONCE DAILY FOR 2 CONSECUTIVE DAYS TO FACE      clomiPHENE Citrate (CLOMID PO) Take 50 mg by mouth      doxepin (SINEQUAN) 10 MG capsule Take 10 mg by mouth nightly      carbachol (MIOSTAT) 0.01 % ophthalmic solution 0.5 mLs once      minocycline (MINOCIN;DYNACIN) 50 MG capsule Take 50 mg by mouth daily       No current facility-administered medications for this visit. Allergies   Allergen Reactions    Metoprolol Other (See Comments)     Ringing in ears and dropped heat rate 5 points     Epinephrine      Other reaction(s): Tachycardia         Review of Systems   Constitutional: Negative for activity change and fever. Respiratory: Negative for chest tightness and shortness of breath. Cardiovascular: Negative for chest pain. Skin: Negative for color change. Neurological: Negative for dizziness. Psychiatric/Behavioral: The patient is not nervous/anxious.                                                 Examination:  General Exam:  Vitals: Pulse 64   Resp 16   Ht 5' 10\" (1.778 m)   Wt 195 lb (88.5 kg)   SpO2 95%   BMI 27.98 kg/m²    Physical Exam     Left Upper Extremity:    There is mild global tenderness throughout the shoulder most significant at the anterior lateral aspect, there is maximal tenderness directly over the anterior and superior aspect of the acromioclavicular joint. Well-healed surgical scar over the clavicle. No tenderness to palpation at the healed fracture site of the midshaft clavicle. There is mildly restricted range of motion of the shoulder with pain at the extremes of forward elevation and abduction. Forward elevation 160 degrees, abduction 140 degrees, internal rotation to L5, external rotation 40 degrees. Strength is 5-5 with rotator cuff testing but there is breakaway due to pain. Positive empty can sign. Negative drop arm sign. Positive Neer sign. Positive Babin sign. Moderate pain at the acromioclavicular joint with crossarm test.  No crepitation. Mild clicking at the shoulder with overhead rotational motion. Skin is intact. Sensation is intact throughout the upper extremity. No instability with passive motion of the shoulder. 2+ radial pulse. No edema. No muscle atrophy. Normal scapular motion. Left Lower Extremity:    There is mild diffuse tenderness to palpation throughout the knee maximally along the anterior lateral joint line at the patellofemoral joint. There is no global swelling anteriorly at the knee with no obvious effusion. There is 5 out of 5 strength with knee flexion and extension. Sensation is intact throughout the lower extremity. There is full range of motion at the knee with discomfort at the extremes of motion, especially terminal flexion. No instability with varus or valgus stress testing or anterior/posterior drawer testing. Negative medial and lateral Brandee's test.  Skin is intact. Normal knee alignment. Pulses intact.     Diagnostic testing:  X-ray images were reviewed by myself and discussed with the patient:  Narrative   4 views of the left knee show evidence of minimal degenerative changes    with mild spurring of the lateral patella on the sunrise view and minimal    joint space narrowing at the medial compartment.  Normal alignment.     Normal bone density.  No fracture or acute abnormality.  No joint    effusion.       Impression: Minimal degenerative changes         MRI images of the left shoulder were reviewed by myself and discussed with the patient today:     Impression   1. No rotator cuff or biceps tear. 2. No fluid in the subacromial/subdeltoid bursa.             Office Procedures:  Orders Placed This Encounter   Procedures    LA ARTHROCENTESIS ASPIR&/INJ MAJOR JT/BURSA W/O US       Assessment and Plan  1. Left shoulder impingement syndrome with mild acromioclavicular joint primary osteoarthritis    2. Left knee mild primary osteoarthritis    I reviewed the MRI findings with the patient explained that there is no evidence of structural damage to the rotator cuff or shoulder joint. We discussed the minimal degenerative findings present at his acromioclavicular joint. I have recommended continued conservative measures with respect to the shoulder. We have tried steroid injections in the past and has been using a limited basis if he is having severe symptoms. I do not recommend any further surgical intervention to the shoulder at this time. We discussed his ongoing left knee pain and stiffness. I recommended we try a steroid injection to see if this helps with symptomatic relief at the patellofemoral joint. Procedure Note, Left Knee Intra-articular Injection:    The left knee was prepped with alcohol. A 22 gauge needle was inserted into the knee joint. The knee was then injected with 80 mg of Kenalog and 4 mL of 1% plain lidocaine. A sterile Band-Aid was applied. The patient tolerated the procedure well with no complications. Continue home exercise program.  Advance activities as tolerated.   Follow-up as needed      Electronically signed by Kalyn Blanca MD on 7/11/2021 at 8:32 PM

## 2022-03-08 ENCOUNTER — TELEPHONE (OUTPATIENT)
Dept: ORTHOPEDIC SURGERY | Age: 67
End: 2022-03-08

## 2022-03-08 ENCOUNTER — OFFICE VISIT (OUTPATIENT)
Dept: ORTHOPEDIC SURGERY | Age: 67
End: 2022-03-08
Payer: MEDICARE

## 2022-03-08 VITALS
HEART RATE: 72 BPM | WEIGHT: 195 LBS | HEIGHT: 70 IN | OXYGEN SATURATION: 97 % | RESPIRATION RATE: 17 BRPM | BODY MASS INDEX: 27.92 KG/M2

## 2022-03-08 DIAGNOSIS — M17.12 PRIMARY OSTEOARTHRITIS OF LEFT KNEE: Primary | ICD-10-CM

## 2022-03-08 PROCEDURE — 1036F TOBACCO NON-USER: CPT | Performed by: ORTHOPAEDIC SURGERY

## 2022-03-08 PROCEDURE — 3017F COLORECTAL CA SCREEN DOC REV: CPT | Performed by: ORTHOPAEDIC SURGERY

## 2022-03-08 PROCEDURE — 99213 OFFICE O/P EST LOW 20 MIN: CPT | Performed by: ORTHOPAEDIC SURGERY

## 2022-03-08 PROCEDURE — G8484 FLU IMMUNIZE NO ADMIN: HCPCS | Performed by: ORTHOPAEDIC SURGERY

## 2022-03-08 PROCEDURE — 4040F PNEUMOC VAC/ADMIN/RCVD: CPT | Performed by: ORTHOPAEDIC SURGERY

## 2022-03-08 PROCEDURE — G8417 CALC BMI ABV UP PARAM F/U: HCPCS | Performed by: ORTHOPAEDIC SURGERY

## 2022-03-08 PROCEDURE — G8427 DOCREV CUR MEDS BY ELIG CLIN: HCPCS | Performed by: ORTHOPAEDIC SURGERY

## 2022-03-08 PROCEDURE — 1123F ACP DISCUSS/DSCN MKR DOCD: CPT | Performed by: ORTHOPAEDIC SURGERY

## 2022-03-08 RX ORDER — DOXEPIN HYDROCHLORIDE 25 MG/1
CAPSULE ORAL
COMMUNITY
Start: 2022-02-18

## 2022-03-08 RX ORDER — PANTOPRAZOLE SODIUM 20 MG/1
TABLET, DELAYED RELEASE ORAL
COMMUNITY
Start: 2022-03-03

## 2022-03-08 RX ORDER — UBIDECARENONE 100 MG
100 CAPSULE ORAL
COMMUNITY

## 2022-03-08 RX ORDER — DOXYCYCLINE 100 MG/1
TABLET ORAL
COMMUNITY

## 2022-03-08 RX ORDER — ROSUVASTATIN CALCIUM 20 MG/1
TABLET, COATED ORAL
COMMUNITY
Start: 2021-01-18

## 2022-03-08 RX ORDER — FLUOROURACIL 50 MG/G
CREAM TOPICAL
COMMUNITY

## 2022-03-08 ASSESSMENT — ENCOUNTER SYMPTOMS
CHEST TIGHTNESS: 0
COLOR CHANGE: 0
SHORTNESS OF BREATH: 0

## 2022-03-08 NOTE — PROGRESS NOTES
3/8/2022   Chief Complaint   Patient presents with    Knee Pain     Left        History of Present Illness:                             Cyrus Mathew is a 77 y.o. male who presents today for follow-up of his left knee pain. He has continued to have aching symptoms in the anterior medial aspects of the left knee worse with prolonged standing or walking or going up and down stairs. He has been going to physical therapy for about a year intermittently to do work on exercises both for his knee and hip regions. He has been using ice and over-the-counter medications with occasional mild relief of his symptoms. He has tried steroid injection months ago in the past which she did not feel provided any significant or lasting pain relief. He has been doing some research and thinks he may be a good candidate for gel injections of the knee. Patient returned to the office with left knee pain. Pt stated this pain has been going on for years and has not gotten any better with the steroid injection given. Pt stated he has aching and throbbing pains in his knee that will increase with prolonged walking or standing. Pt stated he has been going to physical therapy for a year. Pt has tried conservative measures such as ice, heat and otc medications with mild relief. Pt denies any new injuries. Pt is requesting gel injections. Medical History  Patient's medications, allergies, past medical, surgical, social and family histories were reviewed and updated as appropriate. Review of Systems   Constitutional: Negative for activity change and fever. Respiratory: Negative for chest tightness and shortness of breath. Cardiovascular: Negative for chest pain. Skin: Negative for color change. Neurological: Negative for dizziness. Psychiatric/Behavioral: The patient is not nervous/anxious.                                                 Examination:  General Exam:  Vitals: Pulse 72   Resp 17   Ht 5' 10\" (1.778 m)   Wt 195 lb (88.5 kg)   SpO2 97%   BMI 27.98 kg/m²    Physical Exam     Left Lower Extremity:    There is mild diffuse tenderness to palpation throughout the knee maximally along the medial joint line. There is mild global swelling anteriorly at the knee with no obvious effusion. There is 5 out of 5 strength with knee flexion and extension. Sensation is intact throughout the lower extremity. There is full range of motion at the knee with discomfort at the extremes of motion, especially terminal flexion. No instability with varus or valgus stress testing or anterior/posterior drawer testing. Skin is intact. Normal knee alignment. Pulses intact. Diagnostic testing:  X-rays reviewed in office, I independently reviewed the films in the office today:   X-ray images of the left knee from 6/8/2021 were reviewed by myself and discussed with the patient:  Narrative   4 views of the left knee show evidence of minimal degenerative changes    with mild spurring of the lateral patella on the sunrise view and minimal    joint space narrowing at the medial compartment.  Normal alignment.     Normal bone density.  No fracture or acute abnormality.  No joint    effusion.       Impression: Minimal degenerative changes               Office Procedures:  No orders of the defined types were placed in this encounter. Assessment and Plan  1. Left knee mild primary osteoarthritis    We discussed continuing with conservative nonoperative management of his left knee. Due to his failure of steroid injection, I recommended that we seek approval for viscosupplementation injections. We will contact him once they are approved to proceed with series of three shots. I have encouraged him to continue to remain active and continue with his home exercise program of stretches and strengthening that he learned at physical therapy. Follow-up for injections when approved.       Electronically signed by Ra Whitt MD on 3/8/2022 at 12:05 PM

## 2022-03-08 NOTE — PATIENT INSTRUCTIONS
Continue weight-bearing as tolerated. Continue range of motion exercises as instructed. Ice and elevate as needed. Tylenol or Motrin for pain. We will be calling you when gel shots are approved and getting you scheduled.

## 2022-03-08 NOTE — PROGRESS NOTES
Patient returned to the office with left knee pain. Pt stated this pain has been going on for years and has not gotten any better with the steroid injection given. Pt stated he has aching and throbbing pains in his knee that will increase with prolonged walking or standing. Pt stated he has been going to physical therapy for a year. Pt has tried conservative measures such as ice, heat and otc medications with mild relief. Pt denies any new injuries. Pt is requesting gel injections.

## 2022-03-10 NOTE — TELEPHONE ENCOUNTER
I called [facility] at [phone] and spoke with Alex Cordova who helped me start the precert for Gelsyn 3  for L knee DJD M17.12    Start date 3/10/22  Pending Auth # [id number]  Clinicals were requested and faxed to [phone]

## 2022-04-04 ENCOUNTER — OFFICE VISIT (OUTPATIENT)
Dept: ORTHOPEDIC SURGERY | Age: 67
End: 2022-04-04
Payer: MEDICARE

## 2022-04-04 VITALS
HEIGHT: 70 IN | WEIGHT: 195 LBS | RESPIRATION RATE: 16 BRPM | HEART RATE: 90 BPM | OXYGEN SATURATION: 96 % | BODY MASS INDEX: 27.92 KG/M2

## 2022-04-04 DIAGNOSIS — M17.12 PRIMARY OSTEOARTHRITIS OF LEFT KNEE: Primary | ICD-10-CM

## 2022-04-04 PROCEDURE — 99999 PR OFFICE/OUTPT VISIT,PROCEDURE ONLY: CPT | Performed by: PHYSICIAN ASSISTANT

## 2022-04-04 PROCEDURE — 20610 DRAIN/INJ JOINT/BURSA W/O US: CPT | Performed by: PHYSICIAN ASSISTANT

## 2022-04-04 NOTE — PATIENT INSTRUCTIONS
Continue to weight bear as tolerated  Continue range of motion   Ice and elevate as needed  Tylenol or motrin for pain   Injection given today in office   Rest for 24-48 hours  Follow up next week #2

## 2022-04-04 NOTE — PROGRESS NOTES
VISCO-SUPPLEMENTATION INJECTION (Number 1)    HISTORY OF PRESENT ILLNESS (HPI):   Angel Gooden is a 79y.o. year old male who is here for follow up for visco-supplementation injection number 1 for   1. Primary osteoarthritis of left knee    He has seen Dr. Evone Sacks in the past and had steroid injection in the knee but he is here today to do viscosupplementation injections in the knee.     PAST HISTORY:   Unless otherwise specified in this note, the past history is reviewed today with the patient and is as follows-    Allergies   Allergen Reactions    Metoprolol Other (See Comments)     Ringing in ears and dropped heat rate 5 points     Epinephrine      Other reaction(s): Tachycardia       Current Outpatient Medications   Medication Sig Dispense Refill    pantoprazole (PROTONIX) 20 MG tablet       fluorouracil (EFUDEX) 5 % cream fluorouracil 5 % topical cream      coenzyme Q10 100 MG CAPS capsule Take 100 mg by mouth      doxepin (SINEQUAN) 25 MG capsule       doxycycline monohydrate (ADOXA) 100 MG tablet doxycycline monohydrate 100 mg tablet      rosuvastatin (CRESTOR) 20 MG tablet Take by mouth      temazepam (RESTORIL) 15 MG capsule       RESTASIS 0.05 % ophthalmic emulsion       doxepin (SINEQUAN) 10 MG capsule Take 10 mg by mouth nightly      minocycline (MINOCIN;DYNACIN) 50 MG capsule Take 50 mg by mouth daily      amLODIPine (NORVASC) 2.5 MG tablet  (Patient not taking: Reported on 3/8/2022)      pantoprazole (PROTONIX) 40 MG tablet  (Patient not taking: Reported on 3/8/2022)      Ingenol Mebutate (PICATO) 0.05 % GEL Picato 0.05 % topical gel   APPLY TO THE AFFECTED AREA(S) OF THE  BY TOPICAL ROUTE ONCE DAILY FOR 2 CONSECUTIVE DAYS TO FACE (Patient not taking: Reported on 3/8/2022)      clomiPHENE Citrate (CLOMID PO) Take 50 mg by mouth (Patient not taking: Reported on 3/8/2022)      carbachol (MIOSTAT) 0.01 % ophthalmic solution 0.5 mLs once (Patient not taking: Reported on 3/8/2022) No current facility-administered medications for this visit. PHYSICAL EXAM:   Pulse 90   Resp 16   Ht 5' 10\" (1.778 m)   Wt 195 lb (88.5 kg)   SpO2 96%   BMI 27.98 kg/m²     The left knee is examined:  Inspection:  Skin is intact. No erythema. Palpation:  No swelling or acute tenderness. Neuro/Vascular/Motor:  Sensation to light touch intact. Capillary refill brisk. No focal motor deficits. DIAGNOSIS:     1. Primary osteoarthritis of left knee        PROCEDURE:   Left Knee Aspiration / Injection Procedure:  Multiple treatment options were discussed. Joint injection was recommended. Details of the procedure, potential risks, and potential benefits were discussed. Patient's questions were answered. Patient elected to proceed with procedure. Medication: Moyer Ringle 2ml  NDC #:[address]-69  Procedure:  Sterile technique was used as the skin over the injection site was prepped with alcohol. The knee joint was then injected with the above listed medication through the inferior lateral portion of the knee. A sterile bandage was placed over the injection site. The patient tolerated the procedure well without complication. The patient is scheduled for the second injection in one week.

## 2022-04-11 ENCOUNTER — OFFICE VISIT (OUTPATIENT)
Dept: ORTHOPEDIC SURGERY | Age: 67
End: 2022-04-11
Payer: MEDICARE

## 2022-04-11 VITALS
WEIGHT: 195 LBS | HEIGHT: 70 IN | OXYGEN SATURATION: 96 % | RESPIRATION RATE: 16 BRPM | BODY MASS INDEX: 27.92 KG/M2 | HEART RATE: 73 BPM

## 2022-04-11 DIAGNOSIS — M17.12 PRIMARY OSTEOARTHRITIS OF LEFT KNEE: Primary | ICD-10-CM

## 2022-04-11 PROCEDURE — 99999 PR OFFICE/OUTPT VISIT,PROCEDURE ONLY: CPT | Performed by: PHYSICIAN ASSISTANT

## 2022-04-11 PROCEDURE — 20610 DRAIN/INJ JOINT/BURSA W/O US: CPT | Performed by: PHYSICIAN ASSISTANT

## 2022-04-11 NOTE — PROGRESS NOTES
VISCO-SUPPLEMENTATION INJECTION (Number 2)    HISTORY OF PRESENT ILLNESS (HPI):   León Tapia is a 79y.o. year old male who is here for follow up for visco-supplementation injection number  for   1. Primary osteoarthritis of left knee    He has seen Dr. Smita Kearns in the past and had steroid injection in the knee but he is here today to do viscosupplementation injections in the knee.     PAST HISTORY:   Unless otherwise specified in this note, the past history is reviewed today with the patient and is as follows-    Allergies   Allergen Reactions    Metoprolol Other (See Comments)     Ringing in ears and dropped heat rate 5 points     Epinephrine      Other reaction(s): Tachycardia       Current Outpatient Medications   Medication Sig Dispense Refill    pantoprazole (PROTONIX) 20 MG tablet       fluorouracil (EFUDEX) 5 % cream fluorouracil 5 % topical cream      coenzyme Q10 100 MG CAPS capsule Take 100 mg by mouth      doxepin (SINEQUAN) 25 MG capsule       doxycycline monohydrate (ADOXA) 100 MG tablet doxycycline monohydrate 100 mg tablet      rosuvastatin (CRESTOR) 20 MG tablet Take by mouth      temazepam (RESTORIL) 15 MG capsule       RESTASIS 0.05 % ophthalmic emulsion       doxepin (SINEQUAN) 10 MG capsule Take 10 mg by mouth nightly      minocycline (MINOCIN;DYNACIN) 50 MG capsule Take 50 mg by mouth daily      amLODIPine (NORVASC) 2.5 MG tablet  (Patient not taking: Reported on 3/8/2022)      pantoprazole (PROTONIX) 40 MG tablet  (Patient not taking: Reported on 3/8/2022)      Ingenol Mebutate (PICATO) 0.05 % GEL Picato 0.05 % topical gel   APPLY TO THE AFFECTED AREA(S) OF THE  BY TOPICAL ROUTE ONCE DAILY FOR 2 CONSECUTIVE DAYS TO FACE (Patient not taking: Reported on 3/8/2022)      clomiPHENE Citrate (CLOMID PO) Take 50 mg by mouth (Patient not taking: Reported on 3/8/2022)      carbachol (MIOSTAT) 0.01 % ophthalmic solution 0.5 mLs once (Patient not taking: Reported on 3/8/2022) No current facility-administered medications for this visit. PHYSICAL EXAM:   Pulse 73   Resp 16   Ht 5' 10\" (1.778 m)   Wt 195 lb (88.5 kg)   SpO2 96%   BMI 27.98 kg/m²     The left knee is examined:  Inspection:  Skin is intact. No erythema. Palpation:  No swelling or acute tenderness. Neuro/Vascular/Motor:  Sensation to light touch intact. Capillary refill brisk. No focal motor deficits. DIAGNOSIS:     1. Primary osteoarthritis of left knee        PROCEDURE:   Left Knee Aspiration / Injection Procedure:  Multiple treatment options were discussed. Joint injection was recommended. Details of the procedure, potential risks, and potential benefits were discussed. Patient's questions were answered. Patient elected to proceed with procedure. Medication: Parvez Hathawayores 2ml  NDC #:[address]-89  Procedure:  Sterile technique was used as the skin over the injection site was prepped with alcohol. The knee joint was then injected with the above listed medication through the inferior medial portion of the knee. A sterile bandage was placed over the injection site. The patient tolerated the procedure well without complication. The patient is scheduled for the third injection in one week.

## 2022-04-11 NOTE — PATIENT INSTRUCTIONS
Continue to weight bear as tolerated  Continue range of motion   Ice and elevate as needed  Tylenol or motrin for pain   Injection given today in office   Rest for 24-48 hours  Follow up next week for #3

## 2022-04-18 ENCOUNTER — OFFICE VISIT (OUTPATIENT)
Dept: ORTHOPEDIC SURGERY | Age: 67
End: 2022-04-18
Payer: MEDICARE

## 2022-04-18 VITALS — RESPIRATION RATE: 16 BRPM | HEIGHT: 70 IN | BODY MASS INDEX: 27.92 KG/M2 | WEIGHT: 195 LBS

## 2022-04-18 DIAGNOSIS — M17.12 PRIMARY OSTEOARTHRITIS OF LEFT KNEE: Primary | ICD-10-CM

## 2022-04-18 PROCEDURE — 99999 PR OFFICE/OUTPT VISIT,PROCEDURE ONLY: CPT | Performed by: PHYSICIAN ASSISTANT

## 2022-04-18 PROCEDURE — 20610 DRAIN/INJ JOINT/BURSA W/O US: CPT | Performed by: PHYSICIAN ASSISTANT

## 2022-04-18 NOTE — PROGRESS NOTES
VISCO-SUPPLEMENTATION INJECTION (Number 3)    HISTORY OF PRESENT ILLNESS (HPI):   Moira Winkler is a 79y.o. year old male who is here for follow up for visco-supplementation injection number  for   1. Primary osteoarthritis of left knee    This is his third injection and he states he does not really know if the other injections have really done a whole lot. He states he has noticed one thing and that is the pain really has shifted more from going up the stairs to when he is going down the stairs.     PAST HISTORY:   Unless otherwise specified in this note, the past history is reviewed today with the patient and is as follows-    Allergies   Allergen Reactions    Metoprolol Other (See Comments)     Ringing in ears and dropped heat rate 5 points     Epinephrine      Other reaction(s): Tachycardia       Current Outpatient Medications   Medication Sig Dispense Refill    pantoprazole (PROTONIX) 20 MG tablet       fluorouracil (EFUDEX) 5 % cream fluorouracil 5 % topical cream      coenzyme Q10 100 MG CAPS capsule Take 100 mg by mouth      doxepin (SINEQUAN) 25 MG capsule       doxycycline monohydrate (ADOXA) 100 MG tablet doxycycline monohydrate 100 mg tablet      rosuvastatin (CRESTOR) 20 MG tablet Take by mouth      temazepam (RESTORIL) 15 MG capsule       RESTASIS 0.05 % ophthalmic emulsion       doxepin (SINEQUAN) 10 MG capsule Take 10 mg by mouth nightly      minocycline (MINOCIN;DYNACIN) 50 MG capsule Take 50 mg by mouth daily      amLODIPine (NORVASC) 2.5 MG tablet  (Patient not taking: Reported on 3/8/2022)      pantoprazole (PROTONIX) 40 MG tablet  (Patient not taking: Reported on 3/8/2022)      Ingenol Mebutate (PICATO) 0.05 % GEL Picato 0.05 % topical gel   APPLY TO THE AFFECTED AREA(S) OF THE  BY TOPICAL ROUTE ONCE DAILY FOR 2 CONSECUTIVE DAYS TO FACE (Patient not taking: Reported on 3/8/2022)      clomiPHENE Citrate (CLOMID PO) Take 50 mg by mouth (Patient not taking: Reported on 3/8/2022)      carbachol (MIOSTAT) 0.01 % ophthalmic solution 0.5 mLs once (Patient not taking: Reported on 3/8/2022)       No current facility-administered medications for this visit. PHYSICAL EXAM:   Resp 16   Ht 5' 10\" (1.778 m)   Wt 195 lb (88.5 kg)   BMI 27.98 kg/m²     The left knee is examined:  Inspection:  Skin is intact. No erythema. Palpation:  No swelling or acute tenderness. Neuro/Vascular/Motor:  Sensation to light touch intact. Capillary refill brisk. No focal motor deficits. DIAGNOSIS:     1. Primary osteoarthritis of left knee        PROCEDURE:   Left Knee Aspiration / Injection Procedure:  Multiple treatment options were discussed. Joint injection was recommended. Details of the procedure, potential risks, and potential benefits were discussed. Patient's questions were answered. Patient elected to proceed with procedure. Medication: João Oneill 2ml  NDC #:[address]-32  Procedure:  Sterile technique was used as the skin over the injection site was prepped with alcohol. The knee joint was then injected with the above listed medication through the inferior lateral portion of the knee. A sterile bandage was placed over the injection site. The patient tolerated the procedure well without complication.   He will follow-up as needed

## 2022-04-18 NOTE — PATIENT INSTRUCTIONS
Continue to weight bear as tolerated  Continue range of motion   Ice and elevate as needed  Tylenol or motrin for pain   Injection given today in office   Rest for 24-48 hours  Follow up in as needed

## 2022-10-10 ENCOUNTER — APPOINTMENT (OUTPATIENT)
Dept: CT IMAGING | Age: 67
End: 2022-10-10
Payer: MEDICARE

## 2022-10-10 ENCOUNTER — HOSPITAL ENCOUNTER (EMERGENCY)
Age: 67
Discharge: HOME | End: 2022-10-11
Attending: EMERGENCY MEDICINE
Payer: MEDICARE

## 2022-10-10 DIAGNOSIS — S06.5XAA SUBDURAL HEMATOMA: ICD-10-CM

## 2022-10-10 DIAGNOSIS — S00.03XA HEMATOMA OF SCALP, INITIAL ENCOUNTER: ICD-10-CM

## 2022-10-10 DIAGNOSIS — S09.90XA CLOSED HEAD INJURY, INITIAL ENCOUNTER: Primary | ICD-10-CM

## 2022-10-10 DIAGNOSIS — S00.11XA PERIORBITAL ECCHYMOSIS OF RIGHT EYE, INITIAL ENCOUNTER: ICD-10-CM

## 2022-10-10 DIAGNOSIS — T07.XXXA MULTIPLE ABRASIONS: ICD-10-CM

## 2022-10-10 DIAGNOSIS — S16.1XXA STRAIN OF NECK MUSCLE, INITIAL ENCOUNTER: ICD-10-CM

## 2022-10-10 PROCEDURE — 99284 EMERGENCY DEPT VISIT MOD MDM: CPT

## 2022-10-10 PROCEDURE — 70486 CT MAXILLOFACIAL W/O DYE: CPT

## 2022-10-10 PROCEDURE — 70450 CT HEAD/BRAIN W/O DYE: CPT

## 2022-10-10 RX ORDER — DIAPER,BRIEF,INFANT-TODD,DISP
EACH MISCELLANEOUS ONCE
Status: DISCONTINUED | OUTPATIENT
Start: 2022-10-10 | End: 2022-10-10

## 2022-10-10 RX ORDER — BACITRACIN ZINC, NEOMYCIN SULFATE, POLYMYXIN B SULFATE 3.5; 5000; 4 MG/G; [USP'U]/G; [USP'U]/G
1 OINTMENT TOPICAL ONCE
Status: DISCONTINUED | OUTPATIENT
Start: 2022-10-10 | End: 2022-10-11 | Stop reason: HOSPADM

## 2022-10-10 ASSESSMENT — PAIN SCALES - GENERAL: PAINLEVEL_OUTOF10: 6

## 2022-10-10 ASSESSMENT — PAIN - FUNCTIONAL ASSESSMENT: PAIN_FUNCTIONAL_ASSESSMENT: NONE - DENIES PAIN

## 2022-10-10 ASSESSMENT — PAIN DESCRIPTION - LOCATION: LOCATION: HEAD
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
## 2022-10-11 ENCOUNTER — APPOINTMENT (OUTPATIENT)
Dept: CT IMAGING | Age: 67
End: 2022-10-11
Payer: MEDICARE

## 2022-10-11 VITALS
SYSTOLIC BLOOD PRESSURE: 140 MMHG | HEART RATE: 84 BPM | RESPIRATION RATE: 18 BRPM | OXYGEN SATURATION: 97 % | DIASTOLIC BLOOD PRESSURE: 88 MMHG | WEIGHT: 192.1 LBS | BODY MASS INDEX: 27.56 KG/M2 | TEMPERATURE: 97.8 F

## 2022-10-11 PROCEDURE — 70450 CT HEAD/BRAIN W/O DYE: CPT

## 2022-10-11 NOTE — ED NOTES
Pt returned to be monitored for several hours after requesting to be discharged to go home and take care of a few items. Pt is AA&Ox4 with nad noted. rr even and unlabored. Mask in place. Pt provided warm blankets and is aware of need for hourly neuro checks and repeat CT scan at 0230 am.  Denies any further needs presently. sergei.      Blanca Arias RN  10/11/22 [id number]